# Patient Record
Sex: MALE | Race: BLACK OR AFRICAN AMERICAN | URBAN - METROPOLITAN AREA
[De-identification: names, ages, dates, MRNs, and addresses within clinical notes are randomized per-mention and may not be internally consistent; named-entity substitution may affect disease eponyms.]

---

## 2024-08-27 ENCOUNTER — APPOINTMENT (OUTPATIENT)
Dept: CT IMAGING | Age: 44
DRG: 510 | End: 2024-08-27
Payer: MEDICAID

## 2024-08-27 ENCOUNTER — HOSPITAL ENCOUNTER (INPATIENT)
Age: 44
LOS: 5 days | Discharge: HOME | DRG: 510 | End: 2024-09-01
Attending: EMERGENCY MEDICINE | Admitting: SURGERY
Payer: MEDICAID

## 2024-08-27 ENCOUNTER — APPOINTMENT (OUTPATIENT)
Dept: GENERAL RADIOLOGY | Age: 44
End: 2024-08-27
Payer: OTHER MISCELLANEOUS

## 2024-08-27 ENCOUNTER — APPOINTMENT (OUTPATIENT)
Dept: GENERAL RADIOLOGY | Age: 44
DRG: 510 | End: 2024-08-27
Payer: MEDICAID

## 2024-08-27 ENCOUNTER — HOSPITAL ENCOUNTER (EMERGENCY)
Age: 44
Discharge: ANOTHER HOSPITAL | End: 2024-08-27
Attending: EMERGENCY MEDICINE
Payer: OTHER MISCELLANEOUS

## 2024-08-27 ENCOUNTER — APPOINTMENT (OUTPATIENT)
Dept: CT IMAGING | Age: 44
End: 2024-08-27
Payer: OTHER MISCELLANEOUS

## 2024-08-27 ENCOUNTER — HOSPITAL ENCOUNTER (OUTPATIENT)
Dept: GENERAL RADIOLOGY | Age: 44
Discharge: HOME | End: 2024-08-29
Payer: OTHER MISCELLANEOUS

## 2024-08-27 VITALS
SYSTOLIC BLOOD PRESSURE: 108 MMHG | RESPIRATION RATE: 27 BRPM | HEART RATE: 97 BPM | DIASTOLIC BLOOD PRESSURE: 64 MMHG | OXYGEN SATURATION: 100 %

## 2024-08-27 DIAGNOSIS — S12.9XXA CLOSED FRACTURE OF MULTIPLE CERVICAL VERTEBRAE, INITIAL ENCOUNTER (HCC): ICD-10-CM
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
## 2024-08-27 DIAGNOSIS — S12.591A OTHER CLOSED NONDISPLACED FRACTURE OF SIXTH CERVICAL VERTEBRA, INITIAL ENCOUNTER (HCC): ICD-10-CM

## 2024-08-27 DIAGNOSIS — S52.332A CLOSED DISPLACED OBLIQUE FRACTURE OF SHAFT OF LEFT RADIUS, INITIAL ENCOUNTER: ICD-10-CM

## 2024-08-27 DIAGNOSIS — S22.43XA CLOSED FRACTURE OF MULTIPLE RIBS OF BOTH SIDES, INITIAL ENCOUNTER: ICD-10-CM

## 2024-08-27 DIAGNOSIS — T07.XXXA MULTIPLE INJURIES DUE TO TRAUMA: ICD-10-CM

## 2024-08-27 LAB
25(OH)D3 SERPL-MCNC: 10.6 NG/ML (ref 30–100)
ABO + RH BLD: NORMAL
ABO/RH: NORMAL
ALBUMIN SERPL-MCNC: 4.4 G/DL (ref 3.5–5.2)
ALBUMIN/GLOB SERPL: 1.6 {RATIO} (ref 1–2.5)
ALP SERPL-CCNC: 71 U/L (ref 40–129)
ALT SERPL-CCNC: 39 U/L (ref 10–50)
AMYLASE SERPL-CCNC: 142 U/L (ref 28–100)
ANION GAP SERPL CALCULATED.3IONS-SCNC: 12 MMOL/L (ref 9–16)
ANION GAP SERPL CALCULATED.3IONS-SCNC: 17 MMOL/L (ref 9–16)
ANTIBODY SCREEN: NEGATIVE
ARM BAND NUMBER: NORMAL
ARM BAND NUMBER: NORMAL
AST SERPL-CCNC: 44 U/L (ref 10–50)
BASOPHILS # BLD: 0 K/UL (ref 0–0.2)
BASOPHILS NFR BLD: 0 % (ref 0–2)
BILIRUB SERPL-MCNC: 0.8 MG/DL (ref 0–1.2)
BLOOD BANK DISPENSE STATUS: NORMAL
BLOOD BANK SAMPLE EXPIRATION: NORMAL
BLOOD BANK SAMPLE EXPIRATION: NORMAL
BLOOD BANK SPECIMEN: ABNORMAL
BLOOD GROUP ANTIBODIES SERPL: NEGATIVE
BODY TEMPERATURE: 37
BPU ID: NORMAL
BUN SERPL-MCNC: 11 MG/DL (ref 6–20)
BUN SERPL-MCNC: 13 MG/DL (ref 6–20)
BUN/CREAT SERPL: 8 (ref 9–20)
CALCIUM SERPL-MCNC: 9.3 MG/DL (ref 8.6–10.4)
CHLORIDE SERPL-SCNC: 101 MMOL/L (ref 98–107)
CHLORIDE SERPL-SCNC: 103 MMOL/L (ref 98–107)
CK SERPL-CCNC: 1203 U/L (ref 39–308)
CO2 SERPL-SCNC: 20 MMOL/L (ref 20–31)
CO2 SERPL-SCNC: 23 MMOL/L (ref 20–31)
COHGB MFR BLD: 4.2 % (ref 0–5)
COMPONENT: NORMAL
CREAT SERPL-MCNC: 1.2 MG/DL (ref 0.7–1.2)
CREAT SERPL-MCNC: 1.3 MG/DL (ref 0.7–1.2)
CROSSMATCH RESULT: NORMAL
EOSINOPHIL # BLD: 0 K/UL (ref 0–0.44)
EOSINOPHILS RELATIVE PERCENT: 0 % (ref 1–4)
ERYTHROCYTE [DISTWIDTH] IN BLOOD BY AUTOMATED COUNT: 12.4 % (ref 11.8–14.4)
ERYTHROCYTE [DISTWIDTH] IN BLOOD BY AUTOMATED COUNT: 12.7 % (ref 11.8–14.4)
ETHANOL PERCENT: <0.01 %
ETHANOL PERCENT: NORMAL %
ETHANOLAMINE SERPL-MCNC: <10 MG/DL (ref 0–0.08)
ETHANOLAMINE SERPL-MCNC: <10 MG/DL (ref 0–0.08)
FIBRINOGEN, FUNCTIONAL TEG: 25.7 MM (ref 15–32)
FIO2 ON VENT: ABNORMAL %
GFR, ESTIMATED: 43 ML/MIN/1.73M2
GFR, ESTIMATED: 67 ML/MIN/1.73M2
GLUCOSE SERPL-MCNC: 160 MG/DL (ref 74–99)
GLUCOSE SERPL-MCNC: 207 MG/DL (ref 74–99)
HCO3 VENOUS: 23.9 MMOL/L (ref 24–30)
HCT VFR BLD AUTO: 43.7 % (ref 40.7–50.3)
HCT VFR BLD AUTO: 44.5 % (ref 40.7–50.3)
HGB BLD-MCNC: 15.4 G/DL (ref 13–17)
HGB BLD-MCNC: 16 G/DL (ref 13–17)
IMM GRANULOCYTES # BLD AUTO: 0 K/UL (ref 0–0.3)
IMM GRANULOCYTES NFR BLD: 0 %
INR PPP: 1
INR PPP: 1.1
LIPASE SERPL-CCNC: 32 U/L (ref 13–60)
LY30 (LYSIS) TEG: 0.4 % (ref 0–2.6)
LYMPHOCYTES NFR BLD: 7.26 K/UL (ref 1.1–3.7)
LYMPHOCYTES RELATIVE PERCENT: 30 % (ref 24–43)
MA(MAX CLOT) RAPID TEG: 67.2 MM (ref 52–70)
MCH RBC QN AUTO: 32.1 PG (ref 25.2–33.5)
MCH RBC QN AUTO: 32.2 PG (ref 25.2–33.5)
MCHC RBC AUTO-ENTMCNC: 35.2 G/DL (ref 28.4–34.8)
MCHC RBC AUTO-ENTMCNC: 36 G/DL (ref 28.4–34.8)
MCV RBC AUTO: 89.4 FL (ref 82.6–102.9)
MCV RBC AUTO: 91.2 FL (ref 82.6–102.9)
MONOCYTES NFR BLD: 1.94 K/UL (ref 0.1–1.2)
MONOCYTES NFR BLD: 8 % (ref 3–12)
MORPHOLOGY: ABNORMAL
MORPHOLOGY: ABNORMAL
MYOGLOBIN SERPL-MCNC: 1302 NG/ML (ref 28–72)
NEGATIVE BASE EXCESS, VEN: 0.5 MMOL/L (ref 0–2)
NEUTROPHILS NFR BLD: 62 % (ref 36–65)
NEUTS SEG NFR BLD: 15 K/UL (ref 1.5–8.1)
NRBC BLD-RTO: 0 PER 100 WBC
NRBC BLD-RTO: 0 PER 100 WBC
O2 SAT, VEN: 95.8 % (ref 60–85)
PARTIAL THROMBOPLASTIN TIME: 24.9 SEC (ref 23–36.5)
PARTIAL THROMBOPLASTIN TIME: 29.6 SEC (ref 26.8–34.8)
PCO2 VENOUS: 40.5 MM HG (ref 39–55)
PH VENOUS: 7.39 (ref 7.32–7.42)
PLATELET # BLD AUTO: 456 K/UL (ref 138–453)
PLATELET # BLD AUTO: 506 K/UL (ref 138–453)
PMV BLD AUTO: 9.6 FL (ref 8.1–13.5)
PMV BLD AUTO: 9.8 FL (ref 8.1–13.5)
PO2 VENOUS: 80.9 MM HG (ref 30–50)
POTASSIUM SERPL-SCNC: 3.8 MMOL/L (ref 3.7–5.3)
POTASSIUM SERPL-SCNC: 3.8 MMOL/L (ref 3.7–5.3)
PROT SERPL-MCNC: 7.1 G/DL (ref 6.6–8.7)
PROTHROMBIN TIME: 13.3 SEC (ref 11.7–14.9)
PROTHROMBIN TIME: 13.5 SEC (ref 11.7–14.1)
RBC # BLD AUTO: 4.79 M/UL (ref 4.21–5.77)
RBC # BLD AUTO: 4.98 M/UL (ref 4.21–5.77)
REACTION TIME TEG: 4.7 MIN (ref 4.6–9.1)
SODIUM SERPL-SCNC: 138 MMOL/L (ref 136–145)
SODIUM SERPL-SCNC: 138 MMOL/L (ref 136–145)
TRANSFUSION STATUS: NORMAL
TROPONIN I SERPL HS-MCNC: 14 NG/L (ref 0–22)
TROPONIN I SERPL HS-MCNC: 16 NG/L (ref 0–22)
UNIT DIVISION: 0
WBC OTHER # BLD: 24.2 K/UL (ref 3.5–11.3)
WBC OTHER # BLD: 36.9 K/UL (ref 3.5–11.3)

## 2024-08-27 PROCEDURE — 72125 CT NECK SPINE W/O DYE: CPT

## 2024-08-27 PROCEDURE — 96375 TX/PRO/DX INJ NEW DRUG ADDON: CPT

## 2024-08-27 PROCEDURE — 76376 3D RENDER W/INTRP POSTPROCES: CPT

## 2024-08-27 PROCEDURE — 80307 DRUG TEST PRSMV CHEM ANLYZR: CPT

## 2024-08-27 PROCEDURE — 71260 CT THORAX DX C+: CPT

## 2024-08-27 PROCEDURE — 82565 ASSAY OF CREATININE: CPT

## 2024-08-27 PROCEDURE — 2060000000 HC ICU INTERMEDIATE R&B

## 2024-08-27 PROCEDURE — 2580000003 HC RX 258: Performed by: EMERGENCY MEDICINE

## 2024-08-27 PROCEDURE — 85730 THROMBOPLASTIN TIME PARTIAL: CPT

## 2024-08-27 PROCEDURE — 73562 X-RAY EXAM OF KNEE 3: CPT

## 2024-08-27 PROCEDURE — 82150 ASSAY OF AMYLASE: CPT

## 2024-08-27 PROCEDURE — 6810039001 HC L1 TRAUMA PRIORITY

## 2024-08-27 PROCEDURE — 96365 THER/PROPH/DIAG IV INF INIT: CPT

## 2024-08-27 PROCEDURE — 6370000000 HC RX 637 (ALT 250 FOR IP): Performed by: NURSE PRACTITIONER

## 2024-08-27 PROCEDURE — 73090 X-RAY EXAM OF FOREARM: CPT

## 2024-08-27 PROCEDURE — 84484 ASSAY OF TROPONIN QUANT: CPT

## 2024-08-27 PROCEDURE — 73110 X-RAY EXAM OF WRIST: CPT

## 2024-08-27 PROCEDURE — 86900 BLOOD TYPING SEROLOGIC ABO: CPT

## 2024-08-27 PROCEDURE — 82550 ASSAY OF CK (CPK): CPT

## 2024-08-27 PROCEDURE — 72040 X-RAY EXAM NECK SPINE 2-3 VW: CPT

## 2024-08-27 PROCEDURE — 6360000002 HC RX W HCPCS: Performed by: EMERGENCY MEDICINE

## 2024-08-27 PROCEDURE — 85576 BLOOD PLATELET AGGREGATION: CPT

## 2024-08-27 PROCEDURE — 73590 X-RAY EXAM OF LOWER LEG: CPT

## 2024-08-27 PROCEDURE — 85390 FIBRINOLYSINS SCREEN I&R: CPT

## 2024-08-27 PROCEDURE — 99222 1ST HOSP IP/OBS MODERATE 55: CPT | Performed by: NURSE PRACTITIONER

## 2024-08-27 PROCEDURE — 6360000004 HC RX CONTRAST MEDICATION: Performed by: EMERGENCY MEDICINE

## 2024-08-27 PROCEDURE — 86850 RBC ANTIBODY SCREEN: CPT

## 2024-08-27 PROCEDURE — 83874 ASSAY OF MYOGLOBIN: CPT

## 2024-08-27 PROCEDURE — 73080 X-RAY EXAM OF ELBOW: CPT

## 2024-08-27 PROCEDURE — 85027 COMPLETE CBC AUTOMATED: CPT

## 2024-08-27 PROCEDURE — 86901 BLOOD TYPING SEROLOGIC RH(D): CPT

## 2024-08-27 PROCEDURE — G0480 DRUG TEST DEF 1-7 CLASSES: HCPCS

## 2024-08-27 PROCEDURE — 84703 CHORIONIC GONADOTROPIN ASSAY: CPT

## 2024-08-27 PROCEDURE — 71045 X-RAY EXAM CHEST 1 VIEW: CPT

## 2024-08-27 PROCEDURE — 70450 CT HEAD/BRAIN W/O DYE: CPT

## 2024-08-27 PROCEDURE — 6360000002 HC RX W HCPCS: Performed by: PHYSICIAN ASSISTANT

## 2024-08-27 PROCEDURE — 84520 ASSAY OF UREA NITROGEN: CPT

## 2024-08-27 PROCEDURE — 82947 ASSAY GLUCOSE BLOOD QUANT: CPT

## 2024-08-27 PROCEDURE — 82805 BLOOD GASES W/O2 SATURATION: CPT

## 2024-08-27 PROCEDURE — 99285 EMERGENCY DEPT VISIT HI MDM: CPT

## 2024-08-27 PROCEDURE — 90715 TDAP VACCINE 7 YRS/> IM: CPT | Performed by: PHYSICIAN ASSISTANT

## 2024-08-27 PROCEDURE — 94761 N-INVAS EAR/PLS OXIMETRY MLT: CPT

## 2024-08-27 PROCEDURE — 80051 ELECTROLYTE PANEL: CPT

## 2024-08-27 PROCEDURE — 6360000002 HC RX W HCPCS

## 2024-08-27 PROCEDURE — 6360000004 HC RX CONTRAST MEDICATION: Performed by: NURSE PRACTITIONER

## 2024-08-27 PROCEDURE — 85384 FIBRINOGEN ACTIVITY: CPT

## 2024-08-27 PROCEDURE — 85347 COAGULATION TIME ACTIVATED: CPT

## 2024-08-27 PROCEDURE — 73130 X-RAY EXAM OF HAND: CPT

## 2024-08-27 PROCEDURE — 2580000003 HC RX 258: Performed by: NURSE PRACTITIONER

## 2024-08-27 PROCEDURE — 80053 COMPREHEN METABOLIC PANEL: CPT

## 2024-08-27 PROCEDURE — 85610 PROTHROMBIN TIME: CPT

## 2024-08-27 PROCEDURE — 83690 ASSAY OF LIPASE: CPT

## 2024-08-27 PROCEDURE — 71275 CT ANGIOGRAPHY CHEST: CPT

## 2024-08-27 PROCEDURE — 70498 CT ANGIOGRAPHY NECK: CPT

## 2024-08-27 PROCEDURE — 85025 COMPLETE CBC W/AUTO DIFF WBC: CPT

## 2024-08-27 PROCEDURE — 70486 CT MAXILLOFACIAL W/O DYE: CPT

## 2024-08-27 PROCEDURE — 82306 VITAMIN D 25 HYDROXY: CPT

## 2024-08-27 PROCEDURE — 90471 IMMUNIZATION ADMIN: CPT | Performed by: PHYSICIAN ASSISTANT

## 2024-08-27 RX ORDER — CEFAZOLIN SODIUM IN 0.9 % NACL 2 G/100 ML
2000 PLASTIC BAG, INJECTION (ML) INTRAVENOUS ONCE
Status: COMPLETED | OUTPATIENT
Start: 2024-08-27 | End: 2024-08-27

## 2024-08-27 RX ORDER — METHOCARBAMOL 750 MG/1
750 TABLET, FILM COATED ORAL EVERY 6 HOURS
Status: DISCONTINUED | OUTPATIENT
Start: 2024-08-27 | End: 2024-09-01 | Stop reason: HOSPADM

## 2024-08-27 RX ORDER — SODIUM CHLORIDE 9 MG/ML
INJECTION, SOLUTION INTRAVENOUS PRN
Status: DISCONTINUED | OUTPATIENT
Start: 2024-08-27 | End: 2024-09-01 | Stop reason: HOSPADM

## 2024-08-27 RX ORDER — IOPAMIDOL 755 MG/ML
75 INJECTION, SOLUTION INTRAVASCULAR
Status: COMPLETED | OUTPATIENT
Start: 2024-08-27 | End: 2024-08-27

## 2024-08-27 RX ORDER — ONDANSETRON 2 MG/ML
4 INJECTION INTRAMUSCULAR; INTRAVENOUS EVERY 6 HOURS PRN
Status: DISCONTINUED | OUTPATIENT
Start: 2024-08-27 | End: 2024-09-01 | Stop reason: HOSPADM

## 2024-08-27 RX ORDER — POLYETHYLENE GLYCOL 3350 17 G/17G
17 POWDER, FOR SOLUTION ORAL DAILY
Status: DISCONTINUED | OUTPATIENT
Start: 2024-08-27 | End: 2024-09-01 | Stop reason: HOSPADM

## 2024-08-27 RX ORDER — IOPAMIDOL 755 MG/ML
150 INJECTION, SOLUTION INTRAVASCULAR
Status: COMPLETED | OUTPATIENT
Start: 2024-08-27 | End: 2024-08-27

## 2024-08-27 RX ORDER — SODIUM CHLORIDE, SODIUM LACTATE, POTASSIUM CHLORIDE, AND CALCIUM CHLORIDE .6; .31; .03; .02 G/100ML; G/100ML; G/100ML; G/100ML
1000 INJECTION, SOLUTION INTRAVENOUS ONCE
Status: COMPLETED | OUTPATIENT
Start: 2024-08-27 | End: 2024-08-27

## 2024-08-27 RX ORDER — FENTANYL CITRATE 50 UG/ML
50 INJECTION, SOLUTION INTRAMUSCULAR; INTRAVENOUS ONCE
Status: COMPLETED | OUTPATIENT
Start: 2024-08-27 | End: 2024-08-27

## 2024-08-27 RX ORDER — 0.9 % SODIUM CHLORIDE 0.9 %
1000 INTRAVENOUS SOLUTION INTRAVENOUS ONCE
Status: DISCONTINUED | OUTPATIENT
Start: 2024-08-27 | End: 2024-08-27 | Stop reason: HOSPADM

## 2024-08-27 RX ORDER — ONDANSETRON 4 MG/1
4 TABLET, ORALLY DISINTEGRATING ORAL EVERY 8 HOURS PRN
Status: DISCONTINUED | OUTPATIENT
Start: 2024-08-27 | End: 2024-09-01 | Stop reason: HOSPADM

## 2024-08-27 RX ORDER — FENTANYL CITRATE 50 UG/ML
25 INJECTION, SOLUTION INTRAMUSCULAR; INTRAVENOUS ONCE
Status: DISCONTINUED | OUTPATIENT
Start: 2024-08-27 | End: 2024-08-27

## 2024-08-27 RX ORDER — OXYCODONE HYDROCHLORIDE 5 MG/1
5 TABLET ORAL EVERY 6 HOURS PRN
Status: DISCONTINUED | OUTPATIENT
Start: 2024-08-27 | End: 2024-08-29

## 2024-08-27 RX ORDER — SODIUM CHLORIDE 0.9 % (FLUSH) 0.9 %
10 SYRINGE (ML) INJECTION PRN
Status: DISCONTINUED | OUTPATIENT
Start: 2024-08-27 | End: 2024-09-01 | Stop reason: HOSPADM

## 2024-08-27 RX ORDER — PANTOPRAZOLE SODIUM 40 MG/1
40 TABLET, DELAYED RELEASE ORAL DAILY
COMMUNITY

## 2024-08-27 RX ORDER — SODIUM CHLORIDE, SODIUM LACTATE, POTASSIUM CHLORIDE, CALCIUM CHLORIDE 600; 310; 30; 20 MG/100ML; MG/100ML; MG/100ML; MG/100ML
INJECTION, SOLUTION INTRAVENOUS CONTINUOUS
Status: DISCONTINUED | OUTPATIENT
Start: 2024-08-27 | End: 2024-08-29

## 2024-08-27 RX ORDER — ACETAMINOPHEN 500 MG
1000 TABLET ORAL EVERY 8 HOURS SCHEDULED
Status: DISCONTINUED | OUTPATIENT
Start: 2024-08-27 | End: 2024-09-01 | Stop reason: HOSPADM

## 2024-08-27 RX ORDER — ERGOCALCIFEROL 1.25 MG/1
50000 CAPSULE, LIQUID FILLED ORAL WEEKLY
Qty: 12 CAPSULE | Refills: 1 | Status: SHIPPED | OUTPATIENT
Start: 2024-08-27

## 2024-08-27 RX ORDER — SODIUM CHLORIDE 0.9 % (FLUSH) 0.9 %
5-40 SYRINGE (ML) INJECTION EVERY 12 HOURS SCHEDULED
Status: DISCONTINUED | OUTPATIENT
Start: 2024-08-27 | End: 2024-09-01 | Stop reason: HOSPADM

## 2024-08-27 RX ORDER — ONDANSETRON 2 MG/ML
4 INJECTION INTRAMUSCULAR; INTRAVENOUS ONCE
Status: COMPLETED | OUTPATIENT
Start: 2024-08-27 | End: 2024-08-27

## 2024-08-27 RX ORDER — SODIUM CHLORIDE 9 MG/ML
INJECTION, SOLUTION INTRAVENOUS CONTINUOUS
Status: DISCONTINUED | OUTPATIENT
Start: 2024-08-27 | End: 2024-08-27

## 2024-08-27 RX ORDER — GABAPENTIN 300 MG/1
300 CAPSULE ORAL 3 TIMES DAILY
Status: DISCONTINUED | OUTPATIENT
Start: 2024-08-27 | End: 2024-08-28

## 2024-08-27 RX ADMIN — OXYCODONE HYDROCHLORIDE 5 MG: 5 TABLET ORAL at 23:56

## 2024-08-27 RX ADMIN — SODIUM CHLORIDE, POTASSIUM CHLORIDE, SODIUM LACTATE AND CALCIUM CHLORIDE: 600; 310; 30; 20 INJECTION, SOLUTION INTRAVENOUS at 22:32

## 2024-08-27 RX ADMIN — ACETAMINOPHEN 1000 MG: 500 TABLET ORAL at 16:14

## 2024-08-27 RX ADMIN — ACETAMINOPHEN 1000 MG: 500 TABLET ORAL at 20:54

## 2024-08-27 RX ADMIN — SODIUM CHLORIDE: 9 INJECTION, SOLUTION INTRAVENOUS at 15:32

## 2024-08-27 RX ADMIN — METHOCARBAMOL 750 MG: 750 TABLET ORAL at 20:54

## 2024-08-27 RX ADMIN — FENTANYL CITRATE 50 MCG: 50 INJECTION INTRAMUSCULAR; INTRAVENOUS at 13:52

## 2024-08-27 RX ADMIN — METHOCARBAMOL 750 MG: 750 TABLET ORAL at 16:14

## 2024-08-27 RX ADMIN — SODIUM CHLORIDE, POTASSIUM CHLORIDE, SODIUM LACTATE AND CALCIUM CHLORIDE: 600; 310; 30; 20 INJECTION, SOLUTION INTRAVENOUS at 15:25

## 2024-08-27 RX ADMIN — Medication 2000 MG: at 13:36

## 2024-08-27 RX ADMIN — ONDANSETRON 4 MG: 2 INJECTION INTRAMUSCULAR; INTRAVENOUS at 13:53

## 2024-08-27 RX ADMIN — IOPAMIDOL 150 ML: 755 INJECTION, SOLUTION INTRAVENOUS at 16:05

## 2024-08-27 RX ADMIN — IOPAMIDOL 75 ML: 755 INJECTION, SOLUTION INTRAVENOUS at 13:30

## 2024-08-27 RX ADMIN — OXYCODONE HYDROCHLORIDE 5 MG: 5 TABLET ORAL at 18:10

## 2024-08-27 RX ADMIN — TETANUS TOXOID, REDUCED DIPHTHERIA TOXOID AND ACELLULAR PERTUSSIS VACCINE, ADSORBED 0.5 ML: 5; 2.5; 8; 8; 2.5 SUSPENSION INTRAMUSCULAR at 13:35

## 2024-08-27 RX ADMIN — HYDROMORPHONE HYDROCHLORIDE 0.5 MG: 1 INJECTION, SOLUTION INTRAMUSCULAR; INTRAVENOUS; SUBCUTANEOUS at 15:20

## 2024-08-27 RX ADMIN — GABAPENTIN 300 MG: 300 CAPSULE ORAL at 16:14

## 2024-08-27 RX ADMIN — SODIUM CHLORIDE, SODIUM LACTATE, POTASSIUM CHLORIDE, AND CALCIUM CHLORIDE 1000 ML: .6; .31; .03; .02 INJECTION, SOLUTION INTRAVENOUS at 13:52

## 2024-08-27 RX ADMIN — SODIUM CHLORIDE, PRESERVATIVE FREE 10 ML: 5 INJECTION INTRAVENOUS at 22:35

## 2024-08-27 RX ADMIN — GABAPENTIN 300 MG: 300 CAPSULE ORAL at 20:54

## 2024-08-27 ASSESSMENT — PAIN DESCRIPTION - ORIENTATION
ORIENTATION: MID
ORIENTATION: LEFT

## 2024-08-27 ASSESSMENT — PAIN SCALES - GENERAL
PAINLEVEL_OUTOF10: 10
PAINLEVEL_OUTOF10: 8
PAINLEVEL_OUTOF10: 10
PAINLEVEL_OUTOF10: 3
PAINLEVEL_OUTOF10: 10

## 2024-08-27 ASSESSMENT — PAIN DESCRIPTION - LOCATION
LOCATION: ARM
LOCATION: CHEST;ABDOMEN
LOCATION: ARM
LOCATION: BACK;CHEST
LOCATION: CHEST
LOCATION: BACK

## 2024-08-27 ASSESSMENT — LIFESTYLE VARIABLES
HOW OFTEN DO YOU HAVE A DRINK CONTAINING ALCOHOL: NEVER
HOW MANY STANDARD DRINKS CONTAINING ALCOHOL DO YOU HAVE ON A TYPICAL DAY: PATIENT DOES NOT DRINK
HOW OFTEN DO YOU HAVE A DRINK CONTAINING ALCOHOL: NEVER
HOW MANY STANDARD DRINKS CONTAINING ALCOHOL DO YOU HAVE ON A TYPICAL DAY: PATIENT DOES NOT DRINK

## 2024-08-27 ASSESSMENT — PAIN - FUNCTIONAL ASSESSMENT: PAIN_FUNCTIONAL_ASSESSMENT: PREVENTS OR INTERFERES WITH MANY ACTIVE NOT PASSIVE ACTIVITIES

## 2024-08-27 ASSESSMENT — PAIN DESCRIPTION - DESCRIPTORS
DESCRIPTORS: SHARP
DESCRIPTORS: SHARP
DESCRIPTORS: ACHING;DULL

## 2024-08-27 ASSESSMENT — ENCOUNTER SYMPTOMS
BACK PAIN: 1
FACIAL SWELLING: 1
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
## 2024-08-27 NOTE — ED NOTES
Labeled blood specimens sent to lab via tube system.    [] Lavender   [] on ice   [x] Blue   [] Green/yellow  [] Green/black [] on ice  [] Pink  [] Red  [] Yellow  [] on ice  [] Blood Cultures  [] x1 [] x2
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
## 2024-08-27 NOTE — ED NOTES
Report taken from previous shift RN. Pt introduced to writer. Pt resting in bed with call light in reach, respirations even and unlabored, NAD. Pt has no verbalized needs at this time. Whiteboards updated.
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
## 2024-08-28 ENCOUNTER — ANESTHESIA (OUTPATIENT)
Dept: OPERATING ROOM | Age: 44
End: 2024-08-28
Payer: MEDICAID

## 2024-08-28 ENCOUNTER — APPOINTMENT (OUTPATIENT)
Dept: GENERAL RADIOLOGY | Age: 44
DRG: 510 | End: 2024-08-28
Payer: MEDICAID

## 2024-08-28 ENCOUNTER — ANESTHESIA (OUTPATIENT)
Dept: ONCOLOGY | Age: 44
End: 2024-08-28
Payer: MEDICAID

## 2024-08-28 ENCOUNTER — ANESTHESIA EVENT (OUTPATIENT)
Dept: ONCOLOGY | Age: 44
End: 2024-08-28
Payer: MEDICAID

## 2024-08-28 ENCOUNTER — ANESTHESIA EVENT (OUTPATIENT)
Dept: OPERATING ROOM | Age: 44
End: 2024-08-28
Payer: MEDICAID

## 2024-08-28 PROBLEM — S52.302A CLOSED FRACTURE OF SHAFT OF LEFT RADIUS: Status: ACTIVE | Noted: 2024-08-28

## 2024-08-28 PROBLEM — S22.43XA MULTIPLE CLOSED FRACTURES OF RIBS OF BOTH SIDES: Status: ACTIVE | Noted: 2024-08-28

## 2024-08-28 PROBLEM — S12.9XXA CLOSED FRACTURE OF MULTIPLE CERVICAL VERTEBRAE (HCC): Status: ACTIVE | Noted: 2024-08-28

## 2024-08-28 LAB
AMPHET UR QL SCN: NEGATIVE
ANION GAP SERPL CALCULATED.3IONS-SCNC: 12 MMOL/L (ref 9–16)
BARBITURATES UR QL SCN: NEGATIVE
BASOPHILS # BLD: 0 K/UL (ref 0–0.2)
BASOPHILS NFR BLD: 0 % (ref 0–2)
BENZODIAZ UR QL: NEGATIVE
BUN SERPL-MCNC: 12 MG/DL (ref 6–20)
CALCIUM SERPL-MCNC: 8.8 MG/DL (ref 8.6–10.4)
CANNABINOIDS UR QL SCN: POSITIVE
CHLORIDE SERPL-SCNC: 103 MMOL/L (ref 98–107)
CO2 SERPL-SCNC: 21 MMOL/L (ref 20–31)
COCAINE UR QL SCN: POSITIVE
CREAT SERPL-MCNC: 0.9 MG/DL (ref 0.7–1.2)
EOSINOPHIL # BLD: 0 K/UL (ref 0–0.44)
EOSINOPHILS RELATIVE PERCENT: 0 % (ref 1–4)
ERYTHROCYTE [DISTWIDTH] IN BLOOD BY AUTOMATED COUNT: 13.1 % (ref 11.8–14.4)
FENTANYL UR QL: POSITIVE
GFR, ESTIMATED: >90 ML/MIN/1.73M2
GLUCOSE BLD-MCNC: 143 MG/DL (ref 75–110)
GLUCOSE SERPL-MCNC: 126 MG/DL (ref 74–99)
HCT VFR BLD AUTO: 38.9 % (ref 40.7–50.3)
HCT VFR BLD AUTO: 41.9 % (ref 40.7–50.3)
HGB BLD-MCNC: 13.2 G/DL (ref 13–17)
HGB BLD-MCNC: 14.4 G/DL (ref 13–17)
IMM GRANULOCYTES # BLD AUTO: 0 K/UL (ref 0–0.3)
IMM GRANULOCYTES NFR BLD: 0 %
INR PPP: 1.1
LYMPHOCYTES NFR BLD: 2.34 K/UL (ref 1.1–3.7)
LYMPHOCYTES RELATIVE PERCENT: 15 % (ref 24–43)
MCH RBC QN AUTO: 32.1 PG (ref 25.2–33.5)
MCHC RBC AUTO-ENTMCNC: 34.4 G/DL (ref 28.4–34.8)
MCV RBC AUTO: 93.5 FL (ref 82.6–102.9)
METHADONE UR QL: NEGATIVE
MONOCYTES NFR BLD: 1.87 K/UL (ref 0.1–1.2)
MONOCYTES NFR BLD: 12 % (ref 3–12)
MORPHOLOGY: NORMAL
NEUTROPHILS NFR BLD: 73 % (ref 36–65)
NEUTS SEG NFR BLD: 11.39 K/UL (ref 1.5–8.1)
NRBC BLD-RTO: 0 PER 100 WBC
OPIATES UR QL SCN: NEGATIVE
OXYCODONE UR QL SCN: POSITIVE
PCP UR QL SCN: NEGATIVE
PLATELET # BLD AUTO: 381 K/UL (ref 138–453)
PMV BLD AUTO: 10.3 FL (ref 8.1–13.5)
POTASSIUM SERPL-SCNC: 4.4 MMOL/L (ref 3.7–5.3)
PROTHROMBIN TIME: 13.6 SEC (ref 11.7–14.9)
RBC # BLD AUTO: 4.48 M/UL (ref 4.21–5.77)
SODIUM SERPL-SCNC: 136 MMOL/L (ref 136–145)
TEST INFORMATION: ABNORMAL
WBC OTHER # BLD: 15.6 K/UL (ref 3.5–11.3)

## 2024-08-28 PROCEDURE — 6360000002 HC RX W HCPCS: Performed by: CHIROPRACTOR

## 2024-08-28 PROCEDURE — 0PSJ04Z REPOSITION LEFT RADIUS WITH INTERNAL FIXATION DEVICE, OPEN APPROACH: ICD-10-PCS | Performed by: ORTHOPAEDIC SURGERY

## 2024-08-28 PROCEDURE — 6360000002 HC RX W HCPCS: Performed by: NURSE ANESTHETIST, CERTIFIED REGISTERED

## 2024-08-28 PROCEDURE — 2700000000 HC OXYGEN THERAPY PER DAY

## 2024-08-28 PROCEDURE — C9290 INJ, BUPIVACAINE LIPOSOME: HCPCS | Performed by: ANESTHESIOLOGY

## 2024-08-28 PROCEDURE — 85025 COMPLETE CBC W/AUTO DIFF WBC: CPT

## 2024-08-28 PROCEDURE — 6360000002 HC RX W HCPCS: Performed by: ANESTHESIOLOGY

## 2024-08-28 PROCEDURE — 94761 N-INVAS EAR/PLS OXIMETRY MLT: CPT

## 2024-08-28 PROCEDURE — 85014 HEMATOCRIT: CPT

## 2024-08-28 PROCEDURE — 6370000000 HC RX 637 (ALT 250 FOR IP)

## 2024-08-28 PROCEDURE — 6360000002 HC RX W HCPCS

## 2024-08-28 PROCEDURE — 3600000015 HC SURGERY LEVEL 5 ADDTL 15MIN: Performed by: ORTHOPAEDIC SURGERY

## 2024-08-28 PROCEDURE — 85018 HEMOGLOBIN: CPT

## 2024-08-28 PROCEDURE — 7100000001 HC PACU RECOVERY - ADDTL 15 MIN: Performed by: ORTHOPAEDIC SURGERY

## 2024-08-28 PROCEDURE — 6370000000 HC RX 637 (ALT 250 FOR IP): Performed by: NURSE PRACTITIONER

## 2024-08-28 PROCEDURE — 25515 OPTX RADIAL SHAFT FRACTURE: CPT | Performed by: ORTHOPAEDIC SURGERY

## 2024-08-28 PROCEDURE — APPSS15 APP SPLIT SHARED TIME 0-15 MINUTES: Performed by: NURSE PRACTITIONER

## 2024-08-28 PROCEDURE — 3700000001 HC ADD 15 MINUTES (ANESTHESIA): Performed by: ORTHOPAEDIC SURGERY

## 2024-08-28 PROCEDURE — 7100000000 HC PACU RECOVERY - FIRST 15 MIN: Performed by: ORTHOPAEDIC SURGERY

## 2024-08-28 PROCEDURE — 73090 X-RAY EXAM OF FOREARM: CPT

## 2024-08-28 PROCEDURE — 85610 PROTHROMBIN TIME: CPT

## 2024-08-28 PROCEDURE — 3700000000 HC ANESTHESIA ATTENDED CARE: Performed by: ORTHOPAEDIC SURGERY

## 2024-08-28 PROCEDURE — 76942 ECHO GUIDE FOR BIOPSY: CPT | Performed by: ANESTHESIOLOGY

## 2024-08-28 PROCEDURE — 99254 IP/OBS CNSLTJ NEW/EST MOD 60: CPT | Performed by: ORTHOPAEDIC SURGERY

## 2024-08-28 PROCEDURE — 71045 X-RAY EXAM CHEST 1 VIEW: CPT

## 2024-08-28 PROCEDURE — 1200000000 HC SEMI PRIVATE

## 2024-08-28 PROCEDURE — 2709999900 HC NON-CHARGEABLE SUPPLY: Performed by: ORTHOPAEDIC SURGERY

## 2024-08-28 PROCEDURE — 36415 COLL VENOUS BLD VENIPUNCTURE: CPT

## 2024-08-28 PROCEDURE — 2580000003 HC RX 258: Performed by: ORTHOPAEDIC SURGERY

## 2024-08-28 PROCEDURE — 2580000003 HC RX 258

## 2024-08-28 PROCEDURE — 2500000003 HC RX 250 WO HCPCS

## 2024-08-28 PROCEDURE — 3600000005 HC SURGERY LEVEL 5 BASE: Performed by: ORTHOPAEDIC SURGERY

## 2024-08-28 PROCEDURE — 2580000003 HC RX 258: Performed by: NURSE PRACTITIONER

## 2024-08-28 PROCEDURE — 99232 SBSQ HOSP IP/OBS MODERATE 35: CPT | Performed by: SURGERY

## 2024-08-28 PROCEDURE — C1713 ANCHOR/SCREW BN/BN,TIS/BN: HCPCS | Performed by: ORTHOPAEDIC SURGERY

## 2024-08-28 PROCEDURE — 6370000000 HC RX 637 (ALT 250 FOR IP): Performed by: STUDENT IN AN ORGANIZED HEALTH CARE EDUCATION/TRAINING PROGRAM

## 2024-08-28 PROCEDURE — 82947 ASSAY GLUCOSE BLOOD QUANT: CPT

## 2024-08-28 PROCEDURE — 80048 BASIC METABOLIC PNL TOTAL CA: CPT

## 2024-08-28 DEVICE — PLATE BNE L90MM THK3.3MM 7 H BILAT S STL STR LIMIT CNTCT: Type: IMPLANTABLE DEVICE | Site: RADIUS | Status: FUNCTIONAL

## 2024-08-28 DEVICE — SCREW BNE L14MM DIA3.5MM CORT S STL ST NONCANNULATED LOK: Type: IMPLANTABLE DEVICE | Site: RADIUS | Status: FUNCTIONAL

## 2024-08-28 DEVICE — SCREW BNE L16MM DIA3.5MM CORT S STL ST NONCANNULATED LOK: Type: IMPLANTABLE DEVICE | Site: RADIUS | Status: FUNCTIONAL

## 2024-08-28 RX ORDER — BUPIVACAINE HYDROCHLORIDE 5 MG/ML
INJECTION, SOLUTION EPIDURAL; INTRACAUDAL
Status: COMPLETED | OUTPATIENT
Start: 2024-08-28 | End: 2024-08-28

## 2024-08-28 RX ORDER — FENTANYL CITRATE 50 UG/ML
100 INJECTION, SOLUTION INTRAMUSCULAR; INTRAVENOUS ONCE
Status: COMPLETED | OUTPATIENT
Start: 2024-08-28 | End: 2024-08-28

## 2024-08-28 RX ORDER — FENTANYL CITRATE 50 UG/ML
50 INJECTION, SOLUTION INTRAMUSCULAR; INTRAVENOUS
Status: COMPLETED | OUTPATIENT
Start: 2024-08-28 | End: 2024-08-28

## 2024-08-28 RX ORDER — MIDAZOLAM HYDROCHLORIDE 1 MG/ML
INJECTION INTRAMUSCULAR; INTRAVENOUS PRN
Status: DISCONTINUED | OUTPATIENT
Start: 2024-08-28 | End: 2024-08-28 | Stop reason: SDUPTHER

## 2024-08-28 RX ORDER — NALOXONE HYDROCHLORIDE 0.4 MG/ML
INJECTION, SOLUTION INTRAMUSCULAR; INTRAVENOUS; SUBCUTANEOUS PRN
Status: DISCONTINUED | OUTPATIENT
Start: 2024-08-28 | End: 2024-08-28 | Stop reason: HOSPADM

## 2024-08-28 RX ORDER — SODIUM CHLORIDE, SODIUM LACTATE, POTASSIUM CHLORIDE, CALCIUM CHLORIDE 600; 310; 30; 20 MG/100ML; MG/100ML; MG/100ML; MG/100ML
INJECTION, SOLUTION INTRAVENOUS CONTINUOUS PRN
Status: DISCONTINUED | OUTPATIENT
Start: 2024-08-28 | End: 2024-08-28 | Stop reason: SDUPTHER

## 2024-08-28 RX ORDER — HYDRALAZINE HYDROCHLORIDE 20 MG/ML
10 INJECTION INTRAMUSCULAR; INTRAVENOUS ONCE
Status: COMPLETED | OUTPATIENT
Start: 2024-08-28 | End: 2024-08-28

## 2024-08-28 RX ORDER — LIDOCAINE HYDROCHLORIDE 10 MG/ML
INJECTION, SOLUTION EPIDURAL; INFILTRATION; INTRACAUDAL; PERINEURAL PRN
Status: DISCONTINUED | OUTPATIENT
Start: 2024-08-28 | End: 2024-08-28 | Stop reason: SDUPTHER

## 2024-08-28 RX ORDER — PROPOFOL 10 MG/ML
INJECTION, EMULSION INTRAVENOUS PRN
Status: DISCONTINUED | OUTPATIENT
Start: 2024-08-28 | End: 2024-08-28 | Stop reason: SDUPTHER

## 2024-08-28 RX ORDER — ENOXAPARIN SODIUM 100 MG/ML
30 INJECTION SUBCUTANEOUS 2 TIMES DAILY
Status: DISCONTINUED | OUTPATIENT
Start: 2024-08-29 | End: 2024-08-29

## 2024-08-28 RX ORDER — HYDRALAZINE HYDROCHLORIDE 20 MG/ML
10 INJECTION INTRAMUSCULAR; INTRAVENOUS
Status: DISCONTINUED | OUTPATIENT
Start: 2024-08-28 | End: 2024-08-28 | Stop reason: HOSPADM

## 2024-08-28 RX ORDER — SODIUM CHLORIDE 9 MG/ML
INJECTION, SOLUTION INTRAVENOUS PRN
Status: DISCONTINUED | OUTPATIENT
Start: 2024-08-28 | End: 2024-08-28 | Stop reason: HOSPADM

## 2024-08-28 RX ORDER — SODIUM CHLORIDE 0.9 % (FLUSH) 0.9 %
5-40 SYRINGE (ML) INJECTION EVERY 12 HOURS SCHEDULED
Status: DISCONTINUED | OUTPATIENT
Start: 2024-08-28 | End: 2024-08-28 | Stop reason: HOSPADM

## 2024-08-28 RX ORDER — ROCURONIUM BROMIDE 10 MG/ML
INJECTION, SOLUTION INTRAVENOUS PRN
Status: DISCONTINUED | OUTPATIENT
Start: 2024-08-28 | End: 2024-08-28 | Stop reason: SDUPTHER

## 2024-08-28 RX ORDER — GINSENG 100 MG
CAPSULE ORAL 3 TIMES DAILY
Status: DISCONTINUED | OUTPATIENT
Start: 2024-08-28 | End: 2024-09-01 | Stop reason: HOSPADM

## 2024-08-28 RX ORDER — DROPERIDOL 2.5 MG/ML
0.62 INJECTION, SOLUTION INTRAMUSCULAR; INTRAVENOUS
Status: DISCONTINUED | OUTPATIENT
Start: 2024-08-28 | End: 2024-08-28 | Stop reason: HOSPADM

## 2024-08-28 RX ORDER — SUCCINYLCHOLINE/SOD CL,ISO/PF 100 MG/5ML
SYRINGE (ML) INTRAVENOUS PRN
Status: DISCONTINUED | OUTPATIENT
Start: 2024-08-28 | End: 2024-08-28 | Stop reason: SDUPTHER

## 2024-08-28 RX ORDER — FENTANYL CITRATE 50 UG/ML
INJECTION, SOLUTION INTRAMUSCULAR; INTRAVENOUS
Status: COMPLETED
Start: 2024-08-28 | End: 2024-08-28

## 2024-08-28 RX ORDER — MAGNESIUM HYDROXIDE 1200 MG/15ML
LIQUID ORAL CONTINUOUS PRN
Status: COMPLETED | OUTPATIENT
Start: 2024-08-28 | End: 2024-08-28

## 2024-08-28 RX ORDER — GLYCOPYRROLATE 0.2 MG/ML
INJECTION INTRAMUSCULAR; INTRAVENOUS PRN
Status: DISCONTINUED | OUTPATIENT
Start: 2024-08-28 | End: 2024-08-28 | Stop reason: SDUPTHER

## 2024-08-28 RX ORDER — DIPHENHYDRAMINE HYDROCHLORIDE 50 MG/ML
12.5 INJECTION INTRAMUSCULAR; INTRAVENOUS
Status: DISCONTINUED | OUTPATIENT
Start: 2024-08-28 | End: 2024-08-28 | Stop reason: HOSPADM

## 2024-08-28 RX ORDER — MEPERIDINE HYDROCHLORIDE 50 MG/ML
12.5 INJECTION INTRAMUSCULAR; INTRAVENOUS; SUBCUTANEOUS EVERY 5 MIN PRN
Status: DISCONTINUED | OUTPATIENT
Start: 2024-08-28 | End: 2024-08-28 | Stop reason: HOSPADM

## 2024-08-28 RX ORDER — GABAPENTIN 300 MG/1
300 CAPSULE ORAL EVERY 8 HOURS SCHEDULED
Status: DISCONTINUED | OUTPATIENT
Start: 2024-08-28 | End: 2024-08-30

## 2024-08-28 RX ORDER — ONDANSETRON 2 MG/ML
INJECTION INTRAMUSCULAR; INTRAVENOUS PRN
Status: DISCONTINUED | OUTPATIENT
Start: 2024-08-28 | End: 2024-08-28 | Stop reason: SDUPTHER

## 2024-08-28 RX ORDER — MIDAZOLAM HYDROCHLORIDE 2 MG/2ML
2 INJECTION, SOLUTION INTRAMUSCULAR; INTRAVENOUS ONCE
Status: COMPLETED | OUTPATIENT
Start: 2024-08-28 | End: 2024-08-28

## 2024-08-28 RX ORDER — SODIUM CHLORIDE 0.9 % (FLUSH) 0.9 %
5-40 SYRINGE (ML) INJECTION PRN
Status: DISCONTINUED | OUTPATIENT
Start: 2024-08-28 | End: 2024-08-28 | Stop reason: HOSPADM

## 2024-08-28 RX ORDER — NEOSTIGMINE METHYLSULFATE 5 MG/5 ML
SYRINGE (ML) INTRAVENOUS PRN
Status: DISCONTINUED | OUTPATIENT
Start: 2024-08-28 | End: 2024-08-28 | Stop reason: SDUPTHER

## 2024-08-28 RX ORDER — DEXAMETHASONE SODIUM PHOSPHATE 10 MG/ML
INJECTION, SOLUTION INTRAMUSCULAR; INTRAVENOUS PRN
Status: DISCONTINUED | OUTPATIENT
Start: 2024-08-28 | End: 2024-08-28 | Stop reason: SDUPTHER

## 2024-08-28 RX ORDER — MIDAZOLAM HYDROCHLORIDE 1 MG/ML
INJECTION INTRAMUSCULAR; INTRAVENOUS
Status: COMPLETED
Start: 2024-08-28 | End: 2024-08-28

## 2024-08-28 RX ORDER — FENTANYL CITRATE 50 UG/ML
INJECTION, SOLUTION INTRAMUSCULAR; INTRAVENOUS PRN
Status: DISCONTINUED | OUTPATIENT
Start: 2024-08-28 | End: 2024-08-28 | Stop reason: SDUPTHER

## 2024-08-28 RX ORDER — METOCLOPRAMIDE HYDROCHLORIDE 5 MG/ML
10 INJECTION INTRAMUSCULAR; INTRAVENOUS
Status: DISCONTINUED | OUTPATIENT
Start: 2024-08-28 | End: 2024-08-28 | Stop reason: HOSPADM

## 2024-08-28 RX ADMIN — FENTANYL CITRATE 50 MCG: 50 INJECTION, SOLUTION INTRAMUSCULAR; INTRAVENOUS at 16:23

## 2024-08-28 RX ADMIN — OXYCODONE HYDROCHLORIDE 5 MG: 5 TABLET ORAL at 23:58

## 2024-08-28 RX ADMIN — ROCURONIUM BROMIDE 50 MG: 10 INJECTION, SOLUTION INTRAVENOUS at 18:08

## 2024-08-28 RX ADMIN — BACITRACIN: 500 OINTMENT TOPICAL at 22:02

## 2024-08-28 RX ADMIN — SODIUM CHLORIDE, POTASSIUM CHLORIDE, SODIUM LACTATE AND CALCIUM CHLORIDE: 600; 310; 30; 20 INJECTION, SOLUTION INTRAVENOUS at 18:32

## 2024-08-28 RX ADMIN — ONDANSETRON 4 MG: 2 INJECTION INTRAMUSCULAR; INTRAVENOUS at 19:45

## 2024-08-28 RX ADMIN — BUPIVACAINE HYDROCHLORIDE 20 ML: 5 INJECTION, SOLUTION EPIDURAL; INTRACAUDAL; PERINEURAL at 09:40

## 2024-08-28 RX ADMIN — ROCURONIUM BROMIDE 20 MG: 10 INJECTION, SOLUTION INTRAVENOUS at 18:25

## 2024-08-28 RX ADMIN — SODIUM CHLORIDE, POTASSIUM CHLORIDE, SODIUM LACTATE AND CALCIUM CHLORIDE: 600; 310; 30; 20 INJECTION, SOLUTION INTRAVENOUS at 08:08

## 2024-08-28 RX ADMIN — MIDAZOLAM 2 MG: 1 INJECTION INTRAMUSCULAR; INTRAVENOUS at 17:53

## 2024-08-28 RX ADMIN — METHOCARBAMOL 750 MG: 750 TABLET ORAL at 03:48

## 2024-08-28 RX ADMIN — SODIUM CHLORIDE, POTASSIUM CHLORIDE, SODIUM LACTATE AND CALCIUM CHLORIDE: 600; 310; 30; 20 INJECTION, SOLUTION INTRAVENOUS at 17:49

## 2024-08-28 RX ADMIN — ACETAMINOPHEN 1000 MG: 500 TABLET ORAL at 06:11

## 2024-08-28 RX ADMIN — FENTANYL CITRATE 100 MCG: 50 INJECTION, SOLUTION INTRAMUSCULAR; INTRAVENOUS at 17:57

## 2024-08-28 RX ADMIN — OXYCODONE HYDROCHLORIDE 5 MG: 5 TABLET ORAL at 09:23

## 2024-08-28 RX ADMIN — PROPOFOL 200 MG: 10 INJECTION, EMULSION INTRAVENOUS at 17:57

## 2024-08-28 RX ADMIN — Medication 200 MG: at 17:57

## 2024-08-28 RX ADMIN — SODIUM CHLORIDE, POTASSIUM CHLORIDE, SODIUM LACTATE AND CALCIUM CHLORIDE: 600; 310; 30; 20 INJECTION, SOLUTION INTRAVENOUS at 19:58

## 2024-08-28 RX ADMIN — SODIUM CHLORIDE, PRESERVATIVE FREE 10 ML: 5 INJECTION INTRAVENOUS at 09:15

## 2024-08-28 RX ADMIN — HYDRALAZINE HYDROCHLORIDE 10 MG: 20 INJECTION INTRAMUSCULAR; INTRAVENOUS at 22:34

## 2024-08-28 RX ADMIN — Medication 2000 MG: at 18:14

## 2024-08-28 RX ADMIN — FENTANYL CITRATE 50 MCG: 50 INJECTION, SOLUTION INTRAMUSCULAR; INTRAVENOUS at 14:38

## 2024-08-28 RX ADMIN — Medication 5 MG: at 19:43

## 2024-08-28 RX ADMIN — BUPIVACAINE 266 MG: 13.3 INJECTION, SUSPENSION, LIPOSOMAL INFILTRATION at 09:40

## 2024-08-28 RX ADMIN — GLYCOPYRROLATE 0.8 MG: 0.2 INJECTION INTRAMUSCULAR; INTRAVENOUS at 19:43

## 2024-08-28 RX ADMIN — METHOCARBAMOL 750 MG: 750 TABLET ORAL at 09:14

## 2024-08-28 RX ADMIN — LIDOCAINE HYDROCHLORIDE 50 MG: 10 INJECTION, SOLUTION EPIDURAL; INFILTRATION; INTRACAUDAL; PERINEURAL at 17:56

## 2024-08-28 RX ADMIN — GABAPENTIN 300 MG: 300 CAPSULE ORAL at 13:07

## 2024-08-28 RX ADMIN — GABAPENTIN 300 MG: 300 CAPSULE ORAL at 09:14

## 2024-08-28 RX ADMIN — MIDAZOLAM HYDROCHLORIDE 1 MG: 2 INJECTION, SOLUTION INTRAMUSCULAR; INTRAVENOUS at 14:37

## 2024-08-28 RX ADMIN — FENTANYL CITRATE 50 MCG: 50 INJECTION, SOLUTION INTRAMUSCULAR; INTRAVENOUS at 19:07

## 2024-08-28 RX ADMIN — FENTANYL CITRATE 50 MCG: 50 INJECTION, SOLUTION INTRAMUSCULAR; INTRAVENOUS at 19:37

## 2024-08-28 RX ADMIN — DEXAMETHASONE SODIUM PHOSPHATE 10 MG: 10 INJECTION, SOLUTION INTRAMUSCULAR; INTRAVENOUS at 18:25

## 2024-08-28 RX ADMIN — BUPIVACAINE 10 ML: 13.3 INJECTION, SUSPENSION, LIPOSOMAL INFILTRATION at 09:40

## 2024-08-28 RX ADMIN — MIDAZOLAM HYDROCHLORIDE 1 MG: 1 INJECTION, SOLUTION INTRAMUSCULAR; INTRAVENOUS at 14:37

## 2024-08-28 RX ADMIN — ROCURONIUM BROMIDE 10 MG: 10 INJECTION, SOLUTION INTRAVENOUS at 19:05

## 2024-08-28 RX ADMIN — ACETAMINOPHEN 1000 MG: 500 TABLET ORAL at 13:07

## 2024-08-28 ASSESSMENT — ENCOUNTER SYMPTOMS
NAUSEA: 0
VOMITING: 0
ABDOMINAL PAIN: 1
SHORTNESS OF BREATH: 0
WHEEZING: 0

## 2024-08-28 ASSESSMENT — PAIN DESCRIPTION - ORIENTATION
ORIENTATION: LEFT
ORIENTATION: RIGHT
ORIENTATION: LEFT
ORIENTATION: LEFT

## 2024-08-28 ASSESSMENT — PAIN SCALES - GENERAL
PAINLEVEL_OUTOF10: 9
PAINLEVEL_OUTOF10: 3
PAINLEVEL_OUTOF10: 4
PAINLEVEL_OUTOF10: 8
PAINLEVEL_OUTOF10: 8
PAINLEVEL_OUTOF10: 5
PAINLEVEL_OUTOF10: 8

## 2024-08-28 ASSESSMENT — PAIN DESCRIPTION - DESCRIPTORS
DESCRIPTORS: THROBBING
DESCRIPTORS: ACHING
DESCRIPTORS: DISCOMFORT
DESCRIPTORS: THROBBING
DESCRIPTORS: ACHING

## 2024-08-28 ASSESSMENT — PAIN DESCRIPTION - LOCATION
LOCATION: LEG;ANKLE
LOCATION: LEG;ANKLE
LOCATION: NECK;ARM;CHEST
LOCATION: ARM
LOCATION: CHEST

## 2024-08-28 ASSESSMENT — PAIN - FUNCTIONAL ASSESSMENT
PAIN_FUNCTIONAL_ASSESSMENT: NONE - DENIES PAIN
PAIN_FUNCTIONAL_ASSESSMENT: ACTIVITIES ARE NOT PREVENTED

## 2024-08-28 ASSESSMENT — LIFESTYLE VARIABLES: SMOKING_STATUS: 1

## 2024-08-28 NOTE — PLAN OF CARE
Patient:  Orlando Ariza  YOB: 1980     44 y.o. male    Orthopedic post-operative instructions    Orlando Ariza is a 44 y.o. male who is being seen for:    - Left radial shaft fracture s/p Open reduction internal fixation of left radial shaft fracture , DOS: 8/28/24    - No further orthopaedic surgical intervention planned at this time   - Soft dressings on Left forearm . Please maintain and keep clean and dry. Reinforce dressings as needed per nursing.  - PWB of 10 pounds or less to the left arm  - Complete post-op antibiotics: Ancef 2g q8h x2 doses  - Diet: Ok for Adult diet from ortho perspective   - Abx: post op ancef  - DVT ppx:  Okay to start chemical anticoagulation POD#1. At minimum discharge on ASA 81mg BID if medically able for 30 days post-op.   - PT/OT evaluation post-op.  - Pain control and medical management per primary  - Ice and elevate extremity for pain and swelling  - Ok to discharge   after completion of post-op antibiotics, evaluation by PT/OT, and once medically stable   - Follow up with Dr. Riley in clinic 9/12/24   -Please contact Ortho on call with any questions    Jam Justice DO  Orthopedic Surgery Resident, PGY-1  [place], [place]
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
## 2024-08-28 NOTE — PLAN OF CARE
Problem: Pain  Goal: Verbalizes/displays adequate comfort level or baseline comfort level  8/28/2024 1126 by Regina King RN  Outcome: Progressing  Flowsheets (Taken 8/28/2024 1126)  Verbalizes/displays adequate comfort level or baseline comfort level:   Encourage patient to monitor pain and request assistance   Assess pain using appropriate pain scale   Administer analgesics based on type and severity of pain and evaluate response  8/28/2024 0133 by Rosalinda Arvizu RN  Outcome: Progressing     Problem: Skin/Tissue Integrity  Goal: Absence of new skin breakdown  Description: 1.  Monitor for areas of redness and/or skin breakdown  2.  Assess vascular access sites hourly  3.  Every 4-6 hours minimum:  Change oxygen saturation probe site  4.  Every 4-6 hours:  If on nasal continuous positive airway pressure, respiratory therapy assess nares and determine need for appliance change or resting period.  8/28/2024 1126 by Regina King RN  Outcome: Progressing  8/28/2024 0133 by Rosalinda Arvizu RN  Outcome: Progressing     Problem: Safety - Adult  Goal: Free from fall injury  8/28/2024 1126 by Regina King RN  Outcome: Progressing  Flowsheets (Taken 8/28/2024 1126)  Free From Fall Injury: Instruct family/caregiver on patient safety  8/28/2024 0133 by Rosalinda Arvizu RN  Outcome: Progressing

## 2024-08-28 NOTE — PLAN OF CARE
NO ACUTE NEUROSURGICAL INTERVENTION AT THIS TIME    NEUROSURGERY TO SIGN OFF     Please contact Neurosurgery with any questions or acute changes in neurological exam    PATIENT TO FOLLOW UP IN CLINIC:  Follow-up with Neurosurgery  [facility]  [address] [facility] [facility] [address])  [address]  Call [phone] for an appointment.        Follow up in 2-3 weeks with cervical xrays  Continue collar at all times   Electronically signed by MICHAEL Zamora NP on 8/28/2024 at 2:07 PM

## 2024-08-28 NOTE — DISCHARGE INSTRUCTIONS
Spinal Fracture Discharge Instructions     Thank you for choosing [facility] and [facility] for your recovery needs. The following instructions will help to ensure your comfort and that you are well prepared for recovery.    Follow-up Visit:   The office is located at:    [facility]    [facility] [address], [address], [place] [address]    [phone]      [x] Please obtain upright x-rays of your Cervical spine 1-2 days prior to appointment.  Please also call your primary care physician to schedule an appointment for further evaluation and care.       Diet:   You may resume your regular diet.   Be sure to eat a well-balanced diet. Protein promotes wound healing.   Pain medication and decreased activity can cause constipation. Drink 8-10 glasses of water a day, eat fresh fruits and vegetables, and add prunes, raisins and bran cereals to your diet if you do become constipated. A stool softener taken 1-2 times a day is helpful. Dulcolax suppositories or Fleets enemas are also available without a prescription. Call our office if the problem continues.     Activity and Exercise:   No driving until you are seen in the office.   Avoid riding in a car for the first two weeks if possible, until you come to the office for your scheduled follow-up.   Start taking short, frequent walks in the beginning. Bradley, more frequent walks throughout the day are more beneficial than one long walk each day. You may gradually increase the distance; as tolerated. Your brace will help give support to your muscles while you walk.   If your pain increases, you may be walking too much or too far. Try backing off for a day or two and then resume slowly.   No lifting greater than 5 lbs (gallon of milk). No bending at the waist. Instead, bend at the knees and squat to pick something up.   If physical therapy has been prescribed, you are not

## 2024-08-28 NOTE — ANESTHESIA PROCEDURE NOTES
Peripheral Block    Patient location during procedure: patient floor  Reason for block: post-op pain management and at surgeon's request  Start time: 8/28/2024 9:40 AM  End time: 8/28/2024 9:50 AM  Staffing  Performed: anesthesiologist   Anesthesiologist: Pilo Herrera MD  Performed by: Pilo Herrera MD  Authorized by: Pilo Herrera MD    Preanesthetic Checklist  Completed: patient identified, IV checked, site marked, risks and benefits discussed, surgical/procedural consents, equipment checked, pre-op evaluation, timeout performed, anesthesia consent given, oxygen available, monitors applied/VS acknowledged, fire risk safety assessment completed and verbalized and blood product R/B/A discussed and consented  Peripheral Block   Patient position: sitting  Prep: ChloraPrep  Provider prep: mask and sterile gloves  Patient monitoring: cardiac monitor, continuous pulse ox, frequent blood pressure checks, IV access, oxygen and responsive to questions  Block type: Erector spinae  Laterality: left  Injection technique: single-shot  Guidance: paresthesia technique and ultrasound guided  Local infiltration: lidocaine  Infiltration strength: 1 %  Local infiltration: lidocaine  Dose: 5 mLDose: 5 mL    Needle   Needle type: short-bevel   Needle gauge: 20 G  Needle localization: ultrasound guidance  Catheter size: 18 G  Test dose: negative  Needle length: 10 cm  Assessment   Injection assessment: negative aspiration for heme, no paresthesia on injection and no intravascular symptoms  Paresthesia pain: none  Slow fractionated injection: yes  Hemodynamics: stable  Outcomes: patient tolerated procedure well and uncomplicated    Medications Administered  BUPivacaine (MARCAINE) PF injection 0.5% - Perineural   20 mL - 8/28/2024 9:40:00 AM  BUPivacaine liposome (EXPAREL) injection 1.3% - Perineural   10 mL - 8/28/2024 9:40:00 AM

## 2024-08-28 NOTE — CARE COORDINATION
SBIRT-  Met with pt with girlfriend present.  Pt states he has a medical marijuana card. Denies any other substance use.  Drinks alcohol socially  No previous rehab.  Pt also denies having any SI/depression              Alcohol Screening and Brief Intervention        No results for input(s): \"ALC\" in the last 72 hours.    Alcohol Pre-screening  (MEN ONLY) How many times in the past year have you had 5 or more drinks in a day?: None          Drug Pre-Screening none       Drug Screening DAST       Mood Pre-Screening (PHQ-2)  During the past 2 weeks, have you been bothered by, feeling down, depressed or hopeless?  No        I have interviewed Orlando Ariza, [id number] regarding  His alcohol consumption/drug use and risk for excessive use. Screenings were negative.  Patient  N/A intervention at this time.     Deferred []    Completed on: 8/28/2024   DALIA ZELAYA

## 2024-08-29 ENCOUNTER — APPOINTMENT (OUTPATIENT)
Dept: GENERAL RADIOLOGY | Age: 44
DRG: 510 | End: 2024-08-29
Payer: MEDICAID

## 2024-08-29 LAB
ANION GAP SERPL CALCULATED.3IONS-SCNC: 8 MMOL/L (ref 9–16)
BASOPHILS # BLD: 0 K/UL (ref 0–0.2)
BASOPHILS NFR BLD: 0 % (ref 0–2)
BUN SERPL-MCNC: 9 MG/DL (ref 6–20)
CALCIUM SERPL-MCNC: 8.9 MG/DL (ref 8.6–10.4)
CHLORIDE SERPL-SCNC: 101 MMOL/L (ref 98–107)
CO2 SERPL-SCNC: 25 MMOL/L (ref 20–31)
CREAT SERPL-MCNC: 0.9 MG/DL (ref 0.7–1.2)
EOSINOPHIL # BLD: 0 K/UL (ref 0–0.4)
EOSINOPHILS RELATIVE PERCENT: 0 % (ref 1–4)
ERYTHROCYTE [DISTWIDTH] IN BLOOD BY AUTOMATED COUNT: 12.5 % (ref 11.8–14.4)
GFR, ESTIMATED: >90 ML/MIN/1.73M2
GLUCOSE SERPL-MCNC: 138 MG/DL (ref 74–99)
HCT VFR BLD AUTO: 38.5 % (ref 40.7–50.3)
HGB BLD-MCNC: 12.8 G/DL (ref 13–17)
IMM GRANULOCYTES # BLD AUTO: 0 K/UL (ref 0–0.3)
IMM GRANULOCYTES NFR BLD: 0 %
LYMPHOCYTES NFR BLD: 2.81 K/UL (ref 1–4.8)
LYMPHOCYTES RELATIVE PERCENT: 15 % (ref 24–44)
MCH RBC QN AUTO: 31.5 PG (ref 25.2–33.5)
MCHC RBC AUTO-ENTMCNC: 33.2 G/DL (ref 28.4–34.8)
MCV RBC AUTO: 94.8 FL (ref 82.6–102.9)
MONOCYTES NFR BLD: 0.75 K/UL (ref 0.1–0.8)
MONOCYTES NFR BLD: 4 % (ref 1–7)
MORPHOLOGY: NORMAL
NEUTROPHILS NFR BLD: 81 % (ref 36–66)
NEUTS SEG NFR BLD: 15.14 K/UL (ref 1.8–7.7)
NRBC BLD-RTO: 0 PER 100 WBC
PLATELET # BLD AUTO: 386 K/UL (ref 138–453)
PMV BLD AUTO: 10.1 FL (ref 8.1–13.5)
POTASSIUM SERPL-SCNC: 4.5 MMOL/L (ref 3.7–5.3)
RBC # BLD AUTO: 4.06 M/UL (ref 4.21–5.77)
SODIUM SERPL-SCNC: 134 MMOL/L (ref 136–145)
WBC OTHER # BLD: 18.7 K/UL (ref 3.5–11.3)

## 2024-08-29 PROCEDURE — 85025 COMPLETE CBC W/AUTO DIFF WBC: CPT

## 2024-08-29 PROCEDURE — 99232 SBSQ HOSP IP/OBS MODERATE 35: CPT | Performed by: SURGERY

## 2024-08-29 PROCEDURE — 94761 N-INVAS EAR/PLS OXIMETRY MLT: CPT

## 2024-08-29 PROCEDURE — 6360000002 HC RX W HCPCS

## 2024-08-29 PROCEDURE — 97166 OT EVAL MOD COMPLEX 45 MIN: CPT

## 2024-08-29 PROCEDURE — 36415 COLL VENOUS BLD VENIPUNCTURE: CPT

## 2024-08-29 PROCEDURE — 6370000000 HC RX 637 (ALT 250 FOR IP)

## 2024-08-29 PROCEDURE — 80048 BASIC METABOLIC PNL TOTAL CA: CPT

## 2024-08-29 PROCEDURE — 94640 AIRWAY INHALATION TREATMENT: CPT

## 2024-08-29 PROCEDURE — 2060000000 HC ICU INTERMEDIATE R&B

## 2024-08-29 PROCEDURE — 73600 X-RAY EXAM OF ANKLE: CPT

## 2024-08-29 PROCEDURE — 2580000003 HC RX 258

## 2024-08-29 PROCEDURE — 6360000002 HC RX W HCPCS: Performed by: STUDENT IN AN ORGANIZED HEALTH CARE EDUCATION/TRAINING PROGRAM

## 2024-08-29 PROCEDURE — 6370000000 HC RX 637 (ALT 250 FOR IP): Performed by: STUDENT IN AN ORGANIZED HEALTH CARE EDUCATION/TRAINING PROGRAM

## 2024-08-29 PROCEDURE — 2700000000 HC OXYGEN THERAPY PER DAY

## 2024-08-29 PROCEDURE — 71045 X-RAY EXAM CHEST 1 VIEW: CPT

## 2024-08-29 PROCEDURE — 97162 PT EVAL MOD COMPLEX 30 MIN: CPT

## 2024-08-29 PROCEDURE — 97530 THERAPEUTIC ACTIVITIES: CPT

## 2024-08-29 PROCEDURE — 97535 SELF CARE MNGMENT TRAINING: CPT

## 2024-08-29 RX ORDER — OXYCODONE HYDROCHLORIDE 5 MG/1
10 TABLET ORAL EVERY 4 HOURS PRN
Status: DISCONTINUED | OUTPATIENT
Start: 2024-08-29 | End: 2024-09-01

## 2024-08-29 RX ORDER — ENOXAPARIN SODIUM 100 MG/ML
40 INJECTION SUBCUTANEOUS 2 TIMES DAILY
Status: DISCONTINUED | OUTPATIENT
Start: 2024-08-29 | End: 2024-09-01 | Stop reason: HOSPADM

## 2024-08-29 RX ORDER — KETOROLAC TROMETHAMINE 15 MG/ML
15 INJECTION, SOLUTION INTRAMUSCULAR; INTRAVENOUS EVERY 6 HOURS
Status: DISCONTINUED | OUTPATIENT
Start: 2024-08-29 | End: 2024-09-01

## 2024-08-29 RX ORDER — IPRATROPIUM BROMIDE AND ALBUTEROL SULFATE 2.5; .5 MG/3ML; MG/3ML
1 SOLUTION RESPIRATORY (INHALATION)
Status: DISCONTINUED | OUTPATIENT
Start: 2024-08-29 | End: 2024-09-01

## 2024-08-29 RX ORDER — OXYCODONE HYDROCHLORIDE 5 MG/1
5 TABLET ORAL EVERY 4 HOURS PRN
Status: DISCONTINUED | OUTPATIENT
Start: 2024-08-29 | End: 2024-09-01

## 2024-08-29 RX ADMIN — IPRATROPIUM BROMIDE AND ALBUTEROL SULFATE 1 DOSE: .5; 2.5 SOLUTION RESPIRATORY (INHALATION) at 15:06

## 2024-08-29 RX ADMIN — GABAPENTIN 300 MG: 300 CAPSULE ORAL at 21:14

## 2024-08-29 RX ADMIN — OXYCODONE HYDROCHLORIDE 10 MG: 5 TABLET ORAL at 21:13

## 2024-08-29 RX ADMIN — BACITRACIN: 500 OINTMENT TOPICAL at 21:14

## 2024-08-29 RX ADMIN — SODIUM CHLORIDE, PRESERVATIVE FREE 10 ML: 5 INJECTION INTRAVENOUS at 21:14

## 2024-08-29 RX ADMIN — ACETAMINOPHEN 1000 MG: 500 TABLET ORAL at 14:37

## 2024-08-29 RX ADMIN — ACETAMINOPHEN 1000 MG: 500 TABLET ORAL at 21:13

## 2024-08-29 RX ADMIN — BACITRACIN: 500 OINTMENT TOPICAL at 16:18

## 2024-08-29 RX ADMIN — KETOROLAC TROMETHAMINE 15 MG: 15 INJECTION, SOLUTION INTRAMUSCULAR; INTRAVENOUS at 08:56

## 2024-08-29 RX ADMIN — ENOXAPARIN SODIUM 40 MG: 100 INJECTION SUBCUTANEOUS at 08:58

## 2024-08-29 RX ADMIN — KETOROLAC TROMETHAMINE 15 MG: 15 INJECTION, SOLUTION INTRAMUSCULAR; INTRAVENOUS at 14:37

## 2024-08-29 RX ADMIN — KETOROLAC TROMETHAMINE 15 MG: 15 INJECTION, SOLUTION INTRAMUSCULAR; INTRAVENOUS at 21:13

## 2024-08-29 RX ADMIN — ENOXAPARIN SODIUM 40 MG: 100 INJECTION SUBCUTANEOUS at 21:13

## 2024-08-29 RX ADMIN — SODIUM CHLORIDE, POTASSIUM CHLORIDE, SODIUM LACTATE AND CALCIUM CHLORIDE: 600; 310; 30; 20 INJECTION, SOLUTION INTRAVENOUS at 00:05

## 2024-08-29 RX ADMIN — SODIUM CHLORIDE, PRESERVATIVE FREE 10 ML: 5 INJECTION INTRAVENOUS at 09:07

## 2024-08-29 RX ADMIN — ACETAMINOPHEN 1000 MG: 500 TABLET ORAL at 05:28

## 2024-08-29 RX ADMIN — OXYCODONE HYDROCHLORIDE 10 MG: 5 TABLET ORAL at 13:20

## 2024-08-29 RX ADMIN — BACITRACIN: 500 OINTMENT TOPICAL at 08:52

## 2024-08-29 RX ADMIN — Medication 2000 MG: at 01:11

## 2024-08-29 RX ADMIN — METHOCARBAMOL 750 MG: 750 TABLET ORAL at 21:14

## 2024-08-29 RX ADMIN — OXYCODONE HYDROCHLORIDE 10 MG: 5 TABLET ORAL at 08:56

## 2024-08-29 RX ADMIN — METHOCARBAMOL 750 MG: 750 TABLET ORAL at 08:53

## 2024-08-29 RX ADMIN — GABAPENTIN 300 MG: 300 CAPSULE ORAL at 14:37

## 2024-08-29 RX ADMIN — IPRATROPIUM BROMIDE AND ALBUTEROL SULFATE 1 DOSE: .5; 2.5 SOLUTION RESPIRATORY (INHALATION) at 20:32

## 2024-08-29 RX ADMIN — IPRATROPIUM BROMIDE AND ALBUTEROL SULFATE 1 DOSE: .5; 2.5 SOLUTION RESPIRATORY (INHALATION) at 08:40

## 2024-08-29 RX ADMIN — METHOCARBAMOL 750 MG: 750 TABLET ORAL at 14:37

## 2024-08-29 RX ADMIN — Medication 2000 MG: at 16:18

## 2024-08-29 RX ADMIN — Medication 2000 MG: at 08:59

## 2024-08-29 RX ADMIN — METHOCARBAMOL 750 MG: 750 TABLET ORAL at 04:12

## 2024-08-29 RX ADMIN — GABAPENTIN 300 MG: 300 CAPSULE ORAL at 05:28

## 2024-08-29 ASSESSMENT — PAIN DESCRIPTION - DESCRIPTORS
DESCRIPTORS: DISCOMFORT

## 2024-08-29 ASSESSMENT — PAIN DESCRIPTION - LOCATION
LOCATION: ARM;SHOULDER
LOCATION: ARM;CHEST
LOCATION: ARM
LOCATION: CHEST;ANKLE

## 2024-08-29 ASSESSMENT — PAIN SCALES - GENERAL
PAINLEVEL_OUTOF10: 3
PAINLEVEL_OUTOF10: 6
PAINLEVEL_OUTOF10: 9
PAINLEVEL_OUTOF10: 4
PAINLEVEL_OUTOF10: 5
PAINLEVEL_OUTOF10: 6
PAINLEVEL_OUTOF10: 10
PAINLEVEL_OUTOF10: 9

## 2024-08-29 ASSESSMENT — PAIN DESCRIPTION - ORIENTATION
ORIENTATION: LEFT
ORIENTATION: LEFT

## 2024-08-29 ASSESSMENT — PAIN - FUNCTIONAL ASSESSMENT: PAIN_FUNCTIONAL_ASSESSMENT: ACTIVITIES ARE NOT PREVENTED

## 2024-08-29 NOTE — PLAN OF CARE
Problem: Respiratory - Adult  Goal: Achieves optimal ventilation and oxygenation  Outcome: Progressing  Flowsheets (Taken 8/29/2024 6619)  Achieves optimal ventilation and oxygenation:   Assess for changes in mentation and behavior   Assess for changes in respiratory status   Oxygen supplementation based on oxygen saturation or arterial blood gases   Position to facilitate oxygenation and minimize respiratory effort   Encourage broncho-pulmonary hygiene including cough, deep breathe, incentive spirometry   Assess and instruct to report shortness of breath or any respiratory difficulty   Respiratory therapy support as indicated
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
## 2024-08-30 ENCOUNTER — APPOINTMENT (OUTPATIENT)
Dept: GENERAL RADIOLOGY | Age: 44
DRG: 510 | End: 2024-08-30
Payer: MEDICAID

## 2024-08-30 LAB
ANION GAP SERPL CALCULATED.3IONS-SCNC: 10 MMOL/L (ref 9–16)
BASOPHILS # BLD: 0.04 K/UL (ref 0–0.2)
BASOPHILS NFR BLD: 0 % (ref 0–2)
BUN SERPL-MCNC: 12 MG/DL (ref 6–20)
CALCIUM SERPL-MCNC: 8.9 MG/DL (ref 8.6–10.4)
CHLORIDE SERPL-SCNC: 103 MMOL/L (ref 98–107)
CO2 SERPL-SCNC: 26 MMOL/L (ref 20–31)
CREAT SERPL-MCNC: 0.9 MG/DL (ref 0.7–1.2)
EKG ATRIAL RATE: 71 BPM
EKG ATRIAL RATE: 77 BPM
EKG P AXIS: 31 DEGREES
EKG P AXIS: 40 DEGREES
EKG P-R INTERVAL: 172 MS
EKG P-R INTERVAL: 182 MS
EKG Q-T INTERVAL: 364 MS
EKG Q-T INTERVAL: 374 MS
EKG QRS DURATION: 98 MS
EKG QRS DURATION: 98 MS
EKG QTC CALCULATION (BAZETT): 406 MS
EKG QTC CALCULATION (BAZETT): 411 MS
EKG R AXIS: 39 DEGREES
EKG R AXIS: 41 DEGREES
EKG T AXIS: -4 DEGREES
EKG T AXIS: -5 DEGREES
EKG VENTRICULAR RATE: 71 BPM
EKG VENTRICULAR RATE: 77 BPM
EOSINOPHIL # BLD: 0.17 K/UL (ref 0–0.44)
EOSINOPHILS RELATIVE PERCENT: 1 % (ref 1–4)
ERYTHROCYTE [DISTWIDTH] IN BLOOD BY AUTOMATED COUNT: 13 % (ref 11.8–14.4)
GFR, ESTIMATED: >90 ML/MIN/1.73M2
GLUCOSE SERPL-MCNC: 122 MG/DL (ref 74–99)
HCT VFR BLD AUTO: 37.9 % (ref 40.7–50.3)
HGB BLD-MCNC: 12.5 G/DL (ref 13–17)
IMM GRANULOCYTES # BLD AUTO: 0.06 K/UL (ref 0–0.3)
IMM GRANULOCYTES NFR BLD: 1 %
LYMPHOCYTES NFR BLD: 3.5 K/UL (ref 1.1–3.7)
LYMPHOCYTES RELATIVE PERCENT: 27 % (ref 24–43)
MCH RBC QN AUTO: 31.6 PG (ref 25.2–33.5)
MCHC RBC AUTO-ENTMCNC: 33 G/DL (ref 28.4–34.8)
MCV RBC AUTO: 95.9 FL (ref 82.6–102.9)
MONOCYTES NFR BLD: 1.46 K/UL (ref 0.1–1.2)
MONOCYTES NFR BLD: 11 % (ref 3–12)
NEUTROPHILS NFR BLD: 60 % (ref 36–65)
NEUTS SEG NFR BLD: 7.75 K/UL (ref 1.5–8.1)
NRBC BLD-RTO: 0 PER 100 WBC
PLATELET # BLD AUTO: 348 K/UL (ref 138–453)
PMV BLD AUTO: 9.8 FL (ref 8.1–13.5)
POTASSIUM SERPL-SCNC: 4 MMOL/L (ref 3.7–5.3)
RBC # BLD AUTO: 3.95 M/UL (ref 4.21–5.77)
SODIUM SERPL-SCNC: 139 MMOL/L (ref 136–145)
WBC OTHER # BLD: 13 K/UL (ref 3.5–11.3)

## 2024-08-30 PROCEDURE — 6360000002 HC RX W HCPCS: Performed by: STUDENT IN AN ORGANIZED HEALTH CARE EDUCATION/TRAINING PROGRAM

## 2024-08-30 PROCEDURE — 2060000000 HC ICU INTERMEDIATE R&B

## 2024-08-30 PROCEDURE — 36415 COLL VENOUS BLD VENIPUNCTURE: CPT

## 2024-08-30 PROCEDURE — 6370000000 HC RX 637 (ALT 250 FOR IP)

## 2024-08-30 PROCEDURE — 2500000003 HC RX 250 WO HCPCS: Performed by: STUDENT IN AN ORGANIZED HEALTH CARE EDUCATION/TRAINING PROGRAM

## 2024-08-30 PROCEDURE — 6370000000 HC RX 637 (ALT 250 FOR IP): Performed by: STUDENT IN AN ORGANIZED HEALTH CARE EDUCATION/TRAINING PROGRAM

## 2024-08-30 PROCEDURE — 94640 AIRWAY INHALATION TREATMENT: CPT

## 2024-08-30 PROCEDURE — 71045 X-RAY EXAM CHEST 1 VIEW: CPT

## 2024-08-30 PROCEDURE — 85025 COMPLETE CBC W/AUTO DIFF WBC: CPT

## 2024-08-30 PROCEDURE — 2580000003 HC RX 258

## 2024-08-30 PROCEDURE — 2700000000 HC OXYGEN THERAPY PER DAY

## 2024-08-30 PROCEDURE — 93005 ELECTROCARDIOGRAM TRACING: CPT

## 2024-08-30 PROCEDURE — 80048 BASIC METABOLIC PNL TOTAL CA: CPT

## 2024-08-30 PROCEDURE — 99232 SBSQ HOSP IP/OBS MODERATE 35: CPT | Performed by: SURGERY

## 2024-08-30 PROCEDURE — 93005 ELECTROCARDIOGRAM TRACING: CPT | Performed by: SURGERY

## 2024-08-30 RX ORDER — LIDOCAINE 4 G/G
1 PATCH TOPICAL DAILY
Status: DISCONTINUED | OUTPATIENT
Start: 2024-08-30 | End: 2024-09-01 | Stop reason: HOSPADM

## 2024-08-30 RX ORDER — GABAPENTIN 600 MG/1
600 TABLET ORAL EVERY 8 HOURS SCHEDULED
Status: DISCONTINUED | OUTPATIENT
Start: 2024-08-30 | End: 2024-09-01 | Stop reason: HOSPADM

## 2024-08-30 RX ORDER — QUETIAPINE FUMARATE 25 MG/1
50 TABLET, FILM COATED ORAL 2 TIMES DAILY
Status: DISCONTINUED | OUTPATIENT
Start: 2024-08-30 | End: 2024-09-01 | Stop reason: HOSPADM

## 2024-08-30 RX ORDER — LIDOCAINE 4 G/G
1 PATCH TOPICAL DAILY
Status: DISCONTINUED | OUTPATIENT
Start: 2024-08-30 | End: 2024-08-30

## 2024-08-30 RX ADMIN — GABAPENTIN 300 MG: 300 CAPSULE ORAL at 06:02

## 2024-08-30 RX ADMIN — OXYCODONE HYDROCHLORIDE 10 MG: 5 TABLET ORAL at 01:16

## 2024-08-30 RX ADMIN — SODIUM CHLORIDE, PRESERVATIVE FREE 10 ML: 5 INJECTION INTRAVENOUS at 08:09

## 2024-08-30 RX ADMIN — SODIUM CHLORIDE, PRESERVATIVE FREE 10 ML: 5 INJECTION INTRAVENOUS at 21:08

## 2024-08-30 RX ADMIN — OXYCODONE HYDROCHLORIDE 10 MG: 5 TABLET ORAL at 16:41

## 2024-08-30 RX ADMIN — ACETAMINOPHEN 1000 MG: 500 TABLET ORAL at 14:10

## 2024-08-30 RX ADMIN — GABAPENTIN 600 MG: 600 TABLET ORAL at 21:08

## 2024-08-30 RX ADMIN — KETOROLAC TROMETHAMINE 15 MG: 15 INJECTION, SOLUTION INTRAMUSCULAR; INTRAVENOUS at 21:07

## 2024-08-30 RX ADMIN — GABAPENTIN 600 MG: 600 TABLET ORAL at 14:10

## 2024-08-30 RX ADMIN — IPRATROPIUM BROMIDE AND ALBUTEROL SULFATE 1 DOSE: .5; 2.5 SOLUTION RESPIRATORY (INHALATION) at 14:53

## 2024-08-30 RX ADMIN — ACETAMINOPHEN 1000 MG: 500 TABLET ORAL at 21:08

## 2024-08-30 RX ADMIN — IPRATROPIUM BROMIDE AND ALBUTEROL SULFATE 1 DOSE: .5; 2.5 SOLUTION RESPIRATORY (INHALATION) at 21:49

## 2024-08-30 RX ADMIN — KETOROLAC TROMETHAMINE 15 MG: 15 INJECTION, SOLUTION INTRAMUSCULAR; INTRAVENOUS at 03:22

## 2024-08-30 RX ADMIN — QUETIAPINE FUMARATE 50 MG: 25 TABLET ORAL at 10:26

## 2024-08-30 RX ADMIN — QUETIAPINE FUMARATE 50 MG: 25 TABLET ORAL at 21:08

## 2024-08-30 RX ADMIN — ACETAMINOPHEN 1000 MG: 500 TABLET ORAL at 06:02

## 2024-08-30 RX ADMIN — METHOCARBAMOL 750 MG: 750 TABLET ORAL at 21:08

## 2024-08-30 RX ADMIN — Medication 5 MG: at 01:16

## 2024-08-30 RX ADMIN — KETOROLAC TROMETHAMINE 15 MG: 15 INJECTION, SOLUTION INTRAMUSCULAR; INTRAVENOUS at 14:11

## 2024-08-30 RX ADMIN — Medication 0.2 MG/KG/HR: at 10:24

## 2024-08-30 RX ADMIN — BACITRACIN: 500 OINTMENT TOPICAL at 14:11

## 2024-08-30 RX ADMIN — ENOXAPARIN SODIUM 40 MG: 100 INJECTION SUBCUTANEOUS at 08:08

## 2024-08-30 RX ADMIN — METHOCARBAMOL 750 MG: 750 TABLET ORAL at 08:08

## 2024-08-30 RX ADMIN — BACITRACIN: 500 OINTMENT TOPICAL at 10:26

## 2024-08-30 RX ADMIN — METHOCARBAMOL 750 MG: 750 TABLET ORAL at 04:29

## 2024-08-30 RX ADMIN — OXYCODONE HYDROCHLORIDE 10 MG: 5 TABLET ORAL at 07:38

## 2024-08-30 RX ADMIN — ENOXAPARIN SODIUM 40 MG: 100 INJECTION SUBCUTANEOUS at 21:07

## 2024-08-30 RX ADMIN — BACITRACIN: 500 OINTMENT TOPICAL at 21:09

## 2024-08-30 RX ADMIN — HYDROMORPHONE HYDROCHLORIDE 1 MG: 1 INJECTION, SOLUTION INTRAMUSCULAR; INTRAVENOUS; SUBCUTANEOUS at 08:21

## 2024-08-30 RX ADMIN — METHOCARBAMOL 750 MG: 750 TABLET ORAL at 14:11

## 2024-08-30 RX ADMIN — IPRATROPIUM BROMIDE AND ALBUTEROL SULFATE 1 DOSE: .5; 2.5 SOLUTION RESPIRATORY (INHALATION) at 07:48

## 2024-08-30 RX ADMIN — KETOROLAC TROMETHAMINE 15 MG: 15 INJECTION, SOLUTION INTRAMUSCULAR; INTRAVENOUS at 08:08

## 2024-08-30 ASSESSMENT — PAIN - FUNCTIONAL ASSESSMENT: PAIN_FUNCTIONAL_ASSESSMENT: ACTIVITIES ARE NOT PREVENTED

## 2024-08-30 ASSESSMENT — PAIN SCALES - GENERAL
PAINLEVEL_OUTOF10: 7
PAINLEVEL_OUTOF10: 5
PAINLEVEL_OUTOF10: 10
PAINLEVEL_OUTOF10: 4
PAINLEVEL_OUTOF10: 10
PAINLEVEL_OUTOF10: 8
PAINLEVEL_OUTOF10: 5
PAINLEVEL_OUTOF10: 10

## 2024-08-30 ASSESSMENT — PAIN DESCRIPTION - ORIENTATION
ORIENTATION: RIGHT
ORIENTATION: LEFT
ORIENTATION: RIGHT

## 2024-08-30 ASSESSMENT — PAIN DESCRIPTION - LOCATION
LOCATION: RIB CAGE
LOCATION: SHOULDER
LOCATION: SHOULDER
LOCATION: GENERALIZED

## 2024-08-30 ASSESSMENT — PAIN DESCRIPTION - ONSET: ONSET: GRADUAL

## 2024-08-30 ASSESSMENT — PAIN DESCRIPTION - DESCRIPTORS
DESCRIPTORS: DISCOMFORT
DESCRIPTORS: ACHING;DISCOMFORT
DESCRIPTORS: ACHING

## 2024-08-30 NOTE — PLAN OF CARE
Problem: Pain  Goal: Verbalizes/displays adequate comfort level or baseline comfort level  8/30/2024 1647 by Ignacio Blake RN  Outcome: Progressing  8/30/2024 0607 by Lou Howard RN  Outcome: Progressing     Problem: Skin/Tissue Integrity  Goal: Absence of new skin breakdown  Description: 1.  Monitor for areas of redness and/or skin breakdown  2.  Assess vascular access sites hourly  3.  Every 4-6 hours minimum:  Change oxygen saturation probe site  4.  Every 4-6 hours:  If on nasal continuous positive airway pressure, respiratory therapy assess nares and determine need for appliance change or resting period.  8/30/2024 1647 by Ignacio Blake RN  Outcome: Progressing  8/30/2024 0607 by Lou Howard RN  Outcome: Progressing     Problem: Safety - Adult  Goal: Free from fall injury  8/30/2024 1647 by Ignacio Blake RN  Outcome: Progressing  8/30/2024 0607 by Lou Howard RN  Outcome: Progressing     Problem: Discharge Planning  Goal: Discharge to home or other facility with appropriate resources  8/30/2024 1647 by Ignacio Blake RN  Outcome: Progressing  8/30/2024 0607 by Lou Howard RN  Outcome: Progressing     Problem: ABCDS Injury Assessment  Goal: Absence of physical injury  8/30/2024 1647 by Ignacio Blake RN  Outcome: Progressing  8/30/2024 0607 by Lou Howard RN  Outcome: Progressing     Problem: Respiratory - Adult  Goal: Achieves optimal ventilation and oxygenation  8/30/2024 1647 by Ignacio Blake RN  Outcome: Progressing  8/30/2024 0752 by Ibis Lemos RCP  Outcome: Progressing  Flowsheets (Taken 8/30/2024 0749)  Achieves optimal ventilation and oxygenation:   Assess for changes in respiratory status   Assess for changes in mentation and behavior   Position to facilitate oxygenation and minimize respiratory effort   Oxygen supplementation based on oxygen saturation or arterial blood gases   Encourage broncho-pulmonary hygiene including cough, deep breathe,

## 2024-08-30 NOTE — PLAN OF CARE
Problem: Respiratory - Adult  Goal: Achieves optimal ventilation and oxygenation  8/30/2024 0752 by Ibis Lemos, ALEX  Outcome: Progressing  Flowsheets (Taken 8/30/2024 0715)  Achieves optimal ventilation and oxygenation:   Assess for changes in respiratory status   Assess for changes in mentation and behavior   Position to facilitate oxygenation and minimize respiratory effort   Oxygen supplementation based on oxygen saturation or arterial blood gases   Encourage broncho-pulmonary hygiene including cough, deep breathe, incentive spirometry   Assess and instruct to report shortness of breath or any respiratory difficulty   Respiratory therapy support as indicated

## 2024-08-30 NOTE — PLAN OF CARE
Problem: Pain  Goal: Verbalizes/displays adequate comfort level or baseline comfort level  Outcome: Progressing     Problem: Skin/Tissue Integrity  Goal: Absence of new skin breakdown  Description: 1.  Monitor for areas of redness and/or skin breakdown  2.  Assess vascular access sites hourly  3.  Every 4-6 hours minimum:  Change oxygen saturation probe site  4.  Every 4-6 hours:  If on nasal continuous positive airway pressure, respiratory therapy assess nares and determine need for appliance change or resting period.  Outcome: Progressing     Problem: Safety - Adult  Goal: Free from fall injury  Outcome: Progressing     Problem: Discharge Planning  Goal: Discharge to home or other facility with appropriate resources  Outcome: Progressing     Problem: ABCDS Injury Assessment  Goal: Absence of physical injury  Outcome: Progressing

## 2024-08-31 ENCOUNTER — APPOINTMENT (OUTPATIENT)
Dept: GENERAL RADIOLOGY | Age: 44
DRG: 510 | End: 2024-08-31
Payer: MEDICAID
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
## 2024-08-31 PROCEDURE — 97116 GAIT TRAINING THERAPY: CPT

## 2024-08-31 PROCEDURE — 2060000000 HC ICU INTERMEDIATE R&B

## 2024-08-31 PROCEDURE — 2700000000 HC OXYGEN THERAPY PER DAY

## 2024-08-31 PROCEDURE — 2500000003 HC RX 250 WO HCPCS: Performed by: STUDENT IN AN ORGANIZED HEALTH CARE EDUCATION/TRAINING PROGRAM

## 2024-08-31 PROCEDURE — 6370000000 HC RX 637 (ALT 250 FOR IP): Performed by: STUDENT IN AN ORGANIZED HEALTH CARE EDUCATION/TRAINING PROGRAM

## 2024-08-31 PROCEDURE — 94761 N-INVAS EAR/PLS OXIMETRY MLT: CPT

## 2024-08-31 PROCEDURE — 6370000000 HC RX 637 (ALT 250 FOR IP)

## 2024-08-31 PROCEDURE — 6360000002 HC RX W HCPCS: Performed by: STUDENT IN AN ORGANIZED HEALTH CARE EDUCATION/TRAINING PROGRAM

## 2024-08-31 PROCEDURE — 94640 AIRWAY INHALATION TREATMENT: CPT

## 2024-08-31 PROCEDURE — 71045 X-RAY EXAM CHEST 1 VIEW: CPT

## 2024-08-31 PROCEDURE — 97110 THERAPEUTIC EXERCISES: CPT

## 2024-08-31 PROCEDURE — 2580000003 HC RX 258

## 2024-08-31 RX ADMIN — BACITRACIN: 500 OINTMENT TOPICAL at 14:40

## 2024-08-31 RX ADMIN — KETOROLAC TROMETHAMINE 15 MG: 15 INJECTION, SOLUTION INTRAMUSCULAR; INTRAVENOUS at 14:40

## 2024-08-31 RX ADMIN — OXYCODONE HYDROCHLORIDE 5 MG: 5 TABLET ORAL at 11:08

## 2024-08-31 RX ADMIN — KETOROLAC TROMETHAMINE 15 MG: 15 INJECTION, SOLUTION INTRAMUSCULAR; INTRAVENOUS at 20:59

## 2024-08-31 RX ADMIN — IPRATROPIUM BROMIDE AND ALBUTEROL SULFATE 1 DOSE: .5; 2.5 SOLUTION RESPIRATORY (INHALATION) at 21:06

## 2024-08-31 RX ADMIN — METHOCARBAMOL 750 MG: 750 TABLET ORAL at 02:08

## 2024-08-31 RX ADMIN — BACITRACIN: 500 OINTMENT TOPICAL at 08:30

## 2024-08-31 RX ADMIN — OXYCODONE HYDROCHLORIDE 5 MG: 5 TABLET ORAL at 00:04

## 2024-08-31 RX ADMIN — IPRATROPIUM BROMIDE AND ALBUTEROL SULFATE 1 DOSE: .5; 2.5 SOLUTION RESPIRATORY (INHALATION) at 14:07

## 2024-08-31 RX ADMIN — OXYCODONE HYDROCHLORIDE 5 MG: 5 TABLET ORAL at 05:11

## 2024-08-31 RX ADMIN — SODIUM CHLORIDE, PRESERVATIVE FREE 10 ML: 5 INJECTION INTRAVENOUS at 21:01

## 2024-08-31 RX ADMIN — METHOCARBAMOL 750 MG: 750 TABLET ORAL at 08:29

## 2024-08-31 RX ADMIN — Medication 0.2 MG/KG/HR: at 16:24

## 2024-08-31 RX ADMIN — IPRATROPIUM BROMIDE AND ALBUTEROL SULFATE 1 DOSE: .5; 2.5 SOLUTION RESPIRATORY (INHALATION) at 09:41

## 2024-08-31 RX ADMIN — ENOXAPARIN SODIUM 40 MG: 100 INJECTION SUBCUTANEOUS at 08:29

## 2024-08-31 RX ADMIN — GABAPENTIN 600 MG: 600 TABLET ORAL at 05:11

## 2024-08-31 RX ADMIN — SODIUM CHLORIDE, PRESERVATIVE FREE 10 ML: 5 INJECTION INTRAVENOUS at 08:30

## 2024-08-31 RX ADMIN — OXYCODONE HYDROCHLORIDE 5 MG: 5 TABLET ORAL at 17:16

## 2024-08-31 RX ADMIN — QUETIAPINE FUMARATE 50 MG: 25 TABLET ORAL at 20:59

## 2024-08-31 RX ADMIN — QUETIAPINE FUMARATE 50 MG: 25 TABLET ORAL at 08:29

## 2024-08-31 RX ADMIN — ACETAMINOPHEN 1000 MG: 500 TABLET ORAL at 21:47

## 2024-08-31 RX ADMIN — METHOCARBAMOL 750 MG: 750 TABLET ORAL at 14:41

## 2024-08-31 RX ADMIN — GABAPENTIN 600 MG: 600 TABLET ORAL at 21:48

## 2024-08-31 RX ADMIN — ACETAMINOPHEN 1000 MG: 500 TABLET ORAL at 14:41

## 2024-08-31 RX ADMIN — KETOROLAC TROMETHAMINE 15 MG: 15 INJECTION, SOLUTION INTRAMUSCULAR; INTRAVENOUS at 02:08

## 2024-08-31 RX ADMIN — KETOROLAC TROMETHAMINE 15 MG: 15 INJECTION, SOLUTION INTRAMUSCULAR; INTRAVENOUS at 08:29

## 2024-08-31 RX ADMIN — GABAPENTIN 600 MG: 600 TABLET ORAL at 14:41

## 2024-08-31 RX ADMIN — METHOCARBAMOL 750 MG: 750 TABLET ORAL at 20:59

## 2024-08-31 RX ADMIN — OXYCODONE HYDROCHLORIDE 10 MG: 5 TABLET ORAL at 22:51

## 2024-08-31 RX ADMIN — POLYETHYLENE GLYCOL 3350 17 G: 17 POWDER, FOR SOLUTION ORAL at 08:29

## 2024-08-31 RX ADMIN — BACITRACIN: 500 OINTMENT TOPICAL at 20:59

## 2024-08-31 RX ADMIN — ACETAMINOPHEN 1000 MG: 500 TABLET ORAL at 05:11

## 2024-08-31 ASSESSMENT — PAIN - FUNCTIONAL ASSESSMENT
PAIN_FUNCTIONAL_ASSESSMENT: ACTIVITIES ARE NOT PREVENTED
PAIN_FUNCTIONAL_ASSESSMENT: PREVENTS OR INTERFERES WITH MANY ACTIVE NOT PASSIVE ACTIVITIES

## 2024-08-31 ASSESSMENT — PAIN DESCRIPTION - ORIENTATION
ORIENTATION: LEFT
ORIENTATION: RIGHT
ORIENTATION: LEFT

## 2024-08-31 ASSESSMENT — PAIN SCALES - GENERAL
PAINLEVEL_OUTOF10: 6
PAINLEVEL_OUTOF10: 6
PAINLEVEL_OUTOF10: 2
PAINLEVEL_OUTOF10: 6
PAINLEVEL_OUTOF10: 5
PAINLEVEL_OUTOF10: 6
PAINLEVEL_OUTOF10: 4
PAINLEVEL_OUTOF10: 9
PAINLEVEL_OUTOF10: 7
PAINLEVEL_OUTOF10: 5
PAINLEVEL_OUTOF10: 6
PAINLEVEL_OUTOF10: 6
PAINLEVEL_OUTOF10: 2
PAINLEVEL_OUTOF10: 3
PAINLEVEL_OUTOF10: 9
PAINLEVEL_OUTOF10: 5
PAINLEVEL_OUTOF10: 4

## 2024-08-31 ASSESSMENT — PAIN DESCRIPTION - LOCATION
LOCATION: RIB CAGE
LOCATION: RIB CAGE;ARM

## 2024-08-31 ASSESSMENT — PAIN DESCRIPTION - DESCRIPTORS
DESCRIPTORS: ACHING
DESCRIPTORS: DISCOMFORT
DESCRIPTORS: DISCOMFORT

## 2024-08-31 ASSESSMENT — PAIN DESCRIPTION - FREQUENCY
FREQUENCY: INTERMITTENT
FREQUENCY: CONTINUOUS

## 2024-08-31 ASSESSMENT — PAIN DESCRIPTION - ONSET: ONSET: GRADUAL

## 2024-08-31 NOTE — PLAN OF CARE
Problem: Pain  Goal: Verbalizes/displays adequate comfort level or baseline comfort level  Outcome: Progressing     Problem: Skin/Tissue Integrity  Goal: Absence of new skin breakdown  Description: 1.  Monitor for areas of redness and/or skin breakdown  2.  Assess vascular access sites hourly  3.  Every 4-6 hours minimum:  Change oxygen saturation probe site  4.  Every 4-6 hours:  If on nasal continuous positive airway pressure, respiratory therapy assess nares and determine need for appliance change or resting period.  Outcome: Progressing     Problem: Safety - Adult  Goal: Free from fall injury  Outcome: Progressing  Flowsheets (Taken 8/31/2024 0746)  Free From Fall Injury: Instruct family/caregiver on patient safety     Problem: Discharge Planning  Goal: Discharge to home or other facility with appropriate resources  Outcome: Progressing     Problem: ABCDS Injury Assessment  Goal: Absence of physical injury  Outcome: Progressing  Flowsheets (Taken 8/31/2024 0746)  Absence of Physical Injury: Implement safety measures based on patient assessment     Problem: Respiratory - Adult  Goal: Achieves optimal ventilation and oxygenation  Outcome: Progressing

## 2024-08-31 NOTE — PLAN OF CARE
Problem: Pain  Goal: Verbalizes/displays adequate comfort level or baseline comfort level  8/31/2024 0503 by Argentina Gilbert RN  Outcome: Progressing  8/30/2024 1647 by Ignacio Blake RN  Outcome: Progressing     Problem: Skin/Tissue Integrity  Goal: Absence of new skin breakdown  Description: 1.  Monitor for areas of redness and/or skin breakdown  2.  Assess vascular access sites hourly  3.  Every 4-6 hours minimum:  Change oxygen saturation probe site  4.  Every 4-6 hours:  If on nasal continuous positive airway pressure, respiratory therapy assess nares and determine need for appliance change or resting period.  8/31/2024 0503 by Argentina Gilbert RN  Outcome: Progressing  8/30/2024 1647 by Ignacio Blake RN  Outcome: Progressing     Problem: Safety - Adult  Goal: Free from fall injury  8/31/2024 0503 by Argentina Gilbert RN  Outcome: Progressing  8/30/2024 1647 by Ignacio Blake RN  Outcome: Progressing     Problem: Discharge Planning  Goal: Discharge to home or other facility with appropriate resources  8/31/2024 0503 by Argentina Gilbert RN  Outcome: Progressing  8/30/2024 1647 by Ignacio Blake RN  Outcome: Progressing     Problem: ABCDS Injury Assessment  Goal: Absence of physical injury  8/31/2024 0503 by Argentina Gilbert RN  Outcome: Progressing  8/30/2024 1647 by Ignacio Blake RN  Outcome: Progressing     Problem: Respiratory - Adult  Goal: Achieves optimal ventilation and oxygenation  8/31/2024 0503 by Argentina Gilbert RN  Outcome: Progressing  8/30/2024 2151 by Lashonda Marie RCP  Outcome: Progressing  8/30/2024 1647 by Ignacio Blake RN  Outcome: Progressing
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
## 2024-08-31 NOTE — PLAN OF CARE
Problem: Respiratory - Adult  Goal: Achieves optimal ventilation and oxygenation  8/30/2024 2151 by Lashonda Marie RCP  Outcome: Progressing

## 2024-09-01 ENCOUNTER — APPOINTMENT (OUTPATIENT)
Dept: GENERAL RADIOLOGY | Age: 44
DRG: 510 | End: 2024-09-01
Payer: MEDICAID

## 2024-09-01 VITALS
BODY MASS INDEX: 31.24 KG/M2 | HEIGHT: 77 IN | DIASTOLIC BLOOD PRESSURE: 80 MMHG | TEMPERATURE: 97.7 F | OXYGEN SATURATION: 96 % | HEART RATE: 93 BPM | SYSTOLIC BLOOD PRESSURE: 155 MMHG | RESPIRATION RATE: 18 BRPM | WEIGHT: 264.55 LBS

## 2024-09-01 PROBLEM — S02.2XXA CLOSED FRACTURE OF NASAL BONE: Status: ACTIVE | Noted: 2024-09-01

## 2024-09-01 PROBLEM — J93.9 PNEUMOTHORAX: Status: ACTIVE | Noted: 2024-09-01

## 2024-09-01 PROBLEM — J94.2 HEMOTHORAX: Status: ACTIVE | Noted: 2024-09-01

## 2024-09-01 PROCEDURE — 97530 THERAPEUTIC ACTIVITIES: CPT

## 2024-09-01 PROCEDURE — 6360000002 HC RX W HCPCS: Performed by: STUDENT IN AN ORGANIZED HEALTH CARE EDUCATION/TRAINING PROGRAM

## 2024-09-01 PROCEDURE — 6370000000 HC RX 637 (ALT 250 FOR IP)

## 2024-09-01 PROCEDURE — 6370000000 HC RX 637 (ALT 250 FOR IP): Performed by: STUDENT IN AN ORGANIZED HEALTH CARE EDUCATION/TRAINING PROGRAM

## 2024-09-01 PROCEDURE — 2580000003 HC RX 258

## 2024-09-01 PROCEDURE — 97116 GAIT TRAINING THERAPY: CPT

## 2024-09-01 PROCEDURE — 97110 THERAPEUTIC EXERCISES: CPT

## 2024-09-01 PROCEDURE — 71045 X-RAY EXAM CHEST 1 VIEW: CPT

## 2024-09-01 RX ORDER — METHOCARBAMOL 750 MG/1
750 TABLET, FILM COATED ORAL 3 TIMES DAILY
Qty: 21 TABLET | Refills: 0 | Status: SHIPPED | OUTPATIENT
Start: 2024-09-01 | End: 2024-09-01

## 2024-09-01 RX ORDER — IBUPROFEN 600 MG/1
600 TABLET, FILM COATED ORAL
Status: DISCONTINUED | OUTPATIENT
Start: 2024-09-01 | End: 2024-09-01 | Stop reason: HOSPADM

## 2024-09-01 RX ORDER — GABAPENTIN 600 MG/1
300 TABLET ORAL EVERY 8 HOURS SCHEDULED
Qty: 11 TABLET | Refills: 0 | Status: SHIPPED | OUTPATIENT
Start: 2024-09-01 | End: 2024-09-08

## 2024-09-01 RX ORDER — OXYCODONE HYDROCHLORIDE 5 MG/1
5 TABLET ORAL EVERY 8 HOURS PRN
Qty: 9 TABLET | Refills: 0 | Status: SHIPPED | OUTPATIENT
Start: 2024-09-01 | End: 2024-09-04

## 2024-09-01 RX ORDER — IBUPROFEN 400 MG/1
400 TABLET, FILM COATED ORAL EVERY 4 HOURS
Status: DISCONTINUED | OUTPATIENT
Start: 2024-09-01 | End: 2024-09-01

## 2024-09-01 RX ORDER — LIDOCAINE 4 G/G
1 PATCH TOPICAL DAILY
Qty: 7 EACH | Refills: 0 | Status: SHIPPED | OUTPATIENT
Start: 2024-09-02 | End: 2024-09-01

## 2024-09-01 RX ORDER — OXYCODONE HYDROCHLORIDE 5 MG/1
5 TABLET ORAL EVERY 8 HOURS PRN
Qty: 9 TABLET | Refills: 0 | Status: SHIPPED | OUTPATIENT
Start: 2024-09-01 | End: 2024-09-01

## 2024-09-01 RX ORDER — GABAPENTIN 600 MG/1
300 TABLET ORAL EVERY 8 HOURS SCHEDULED
Qty: 11 TABLET | Refills: 0 | Status: SHIPPED | OUTPATIENT
Start: 2024-09-01 | End: 2024-09-01

## 2024-09-01 RX ORDER — METHOCARBAMOL 750 MG/1
750 TABLET, FILM COATED ORAL 3 TIMES DAILY
Qty: 21 TABLET | Refills: 0 | Status: SHIPPED | OUTPATIENT
Start: 2024-09-01 | End: 2024-09-08

## 2024-09-01 RX ORDER — IPRATROPIUM BROMIDE AND ALBUTEROL SULFATE 2.5; .5 MG/3ML; MG/3ML
1 SOLUTION RESPIRATORY (INHALATION) EVERY 4 HOURS PRN
Status: DISCONTINUED | OUTPATIENT
Start: 2024-09-01 | End: 2024-09-01 | Stop reason: HOSPADM

## 2024-09-01 RX ORDER — IBUPROFEN 600 MG/1
600 TABLET, FILM COATED ORAL
Qty: 21 TABLET | Refills: 0 | Status: SHIPPED | OUTPATIENT
Start: 2024-09-01 | End: 2024-09-01

## 2024-09-01 RX ORDER — LIDOCAINE 4 G/G
1 PATCH TOPICAL DAILY
Qty: 7 EACH | Refills: 0 | Status: SHIPPED | OUTPATIENT
Start: 2024-09-02 | End: 2024-09-09

## 2024-09-01 RX ORDER — OXYCODONE HYDROCHLORIDE 5 MG/1
5 TABLET ORAL EVERY 4 HOURS PRN
Status: DISCONTINUED | OUTPATIENT
Start: 2024-09-01 | End: 2024-09-01 | Stop reason: HOSPADM

## 2024-09-01 RX ORDER — IBUPROFEN 600 MG/1
600 TABLET, FILM COATED ORAL
Qty: 21 TABLET | Refills: 0 | Status: SHIPPED | OUTPATIENT
Start: 2024-09-01 | End: 2024-09-08

## 2024-09-01 RX ADMIN — OXYCODONE HYDROCHLORIDE 5 MG: 5 TABLET ORAL at 16:11

## 2024-09-01 RX ADMIN — IBUPROFEN 600 MG: 600 TABLET, FILM COATED ORAL at 12:51

## 2024-09-01 RX ADMIN — ACETAMINOPHEN 1000 MG: 500 TABLET ORAL at 06:13

## 2024-09-01 RX ADMIN — GABAPENTIN 600 MG: 600 TABLET ORAL at 14:26

## 2024-09-01 RX ADMIN — KETOROLAC TROMETHAMINE 15 MG: 15 INJECTION, SOLUTION INTRAMUSCULAR; INTRAVENOUS at 02:26

## 2024-09-01 RX ADMIN — Medication 5 MG: at 02:26

## 2024-09-01 RX ADMIN — IBUPROFEN 600 MG: 600 TABLET, FILM COATED ORAL at 08:07

## 2024-09-01 RX ADMIN — POLYETHYLENE GLYCOL 3350 17 G: 17 POWDER, FOR SOLUTION ORAL at 08:08

## 2024-09-01 RX ADMIN — BACITRACIN: 500 OINTMENT TOPICAL at 15:00

## 2024-09-01 RX ADMIN — BACITRACIN: 500 OINTMENT TOPICAL at 08:07

## 2024-09-01 RX ADMIN — METHOCARBAMOL 750 MG: 750 TABLET ORAL at 02:26

## 2024-09-01 RX ADMIN — ACETAMINOPHEN 1000 MG: 500 TABLET ORAL at 14:26

## 2024-09-01 RX ADMIN — QUETIAPINE FUMARATE 50 MG: 25 TABLET ORAL at 08:08

## 2024-09-01 RX ADMIN — METHOCARBAMOL 750 MG: 750 TABLET ORAL at 08:08

## 2024-09-01 RX ADMIN — IBUPROFEN 600 MG: 600 TABLET, FILM COATED ORAL at 16:11

## 2024-09-01 RX ADMIN — OXYCODONE HYDROCHLORIDE 5 MG: 5 TABLET ORAL at 10:54

## 2024-09-01 RX ADMIN — GABAPENTIN 600 MG: 600 TABLET ORAL at 06:13

## 2024-09-01 RX ADMIN — OXYCODONE HYDROCHLORIDE 10 MG: 5 TABLET ORAL at 04:03

## 2024-09-01 RX ADMIN — SODIUM CHLORIDE, PRESERVATIVE FREE 10 ML: 5 INJECTION INTRAVENOUS at 08:08

## 2024-09-01 RX ADMIN — METHOCARBAMOL 750 MG: 750 TABLET ORAL at 16:11

## 2024-09-01 ASSESSMENT — PAIN DESCRIPTION - DESCRIPTORS
DESCRIPTORS: DISCOMFORT
DESCRIPTORS: ACHING

## 2024-09-01 ASSESSMENT — PAIN DESCRIPTION - LOCATION
LOCATION: RIB CAGE
LOCATION: RIB CAGE
LOCATION: RIB CAGE;ARM
LOCATION: RIB CAGE;ARM
LOCATION: RIB CAGE

## 2024-09-01 ASSESSMENT — PAIN SCALES - GENERAL
PAINLEVEL_OUTOF10: 7
PAINLEVEL_OUTOF10: 7
PAINLEVEL_OUTOF10: 2
PAINLEVEL_OUTOF10: 5
PAINLEVEL_OUTOF10: 5
PAINLEVEL_OUTOF10: 6
PAINLEVEL_OUTOF10: 10

## 2024-09-01 ASSESSMENT — PAIN DESCRIPTION - ORIENTATION: ORIENTATION: RIGHT

## 2024-09-01 NOTE — PLAN OF CARE
Problem: Pain  Goal: Verbalizes/displays adequate comfort level or baseline comfort level  9/1/2024 0451 by Lou Howard RN  Outcome: Progressing     Problem: Skin/Tissue Integrity  Goal: Absence of new skin breakdown  Description: 1.  Monitor for areas of redness and/or skin breakdown  2.  Assess vascular access sites hourly  3.  Every 4-6 hours minimum:  Change oxygen saturation probe site  4.  Every 4-6 hours:  If on nasal continuous positive airway pressure, respiratory therapy assess nares and determine need for appliance change or resting period.  9/1/2024 0451 by Lou Howard RN  Outcome: Progressing     Problem: Safety - Adult  Goal: Free from fall injury  9/1/2024 0451 by Lou Howard RN  Outcome: Progressing     Problem: Discharge Planning  Goal: Discharge to home or other facility with appropriate resources  9/1/2024 0451 by Lou Howard RN  Outcome: Progressing     Problem: ABCDS Injury Assessment  Goal: Absence of physical injury  9/1/2024 0451 by Lou Howard RN  Outcome: Progressing

## 2024-09-01 NOTE — PLAN OF CARE
Problem: Pain  Goal: Verbalizes/displays adequate comfort level or baseline comfort level  9/1/2024 1643 by Charlette Torres RN  Outcome: Completed  9/1/2024 0451 by Lou Howard RN  Outcome: Progressing     Problem: Skin/Tissue Integrity  Goal: Absence of new skin breakdown  Description: 1.  Monitor for areas of redness and/or skin breakdown  2.  Assess vascular access sites hourly  3.  Every 4-6 hours minimum:  Change oxygen saturation probe site  4.  Every 4-6 hours:  If on nasal continuous positive airway pressure, respiratory therapy assess nares and determine need for appliance change or resting period.  9/1/2024 1643 by Charlette Torres RN  Outcome: Completed  9/1/2024 0451 by Lou Howard RN  Outcome: Progressing     Problem: Safety - Adult  Goal: Free from fall injury  9/1/2024 1643 by Charlette Torres RN  Outcome: Completed  Flowsheets (Taken 9/1/2024 0933)  Free From Fall Injury:   Instruct family/caregiver on patient safety   Based on caregiver fall risk screen, instruct family/caregiver to ask for assistance with transferring infant if caregiver noted to have fall risk factors  9/1/2024 0451 by Lou Howard RN  Outcome: Progressing     Problem: Discharge Planning  Goal: Discharge to home or other facility with appropriate resources  9/1/2024 1643 by Charlette Torres RN  Outcome: Completed  9/1/2024 0451 by Lou Howard RN  Outcome: Progressing     Problem: ABCDS Injury Assessment  Goal: Absence of physical injury  9/1/2024 1643 by Charlette Torres RN  Outcome: Completed  Flowsheets (Taken 9/1/2024 0933)  Absence of Physical Injury: Implement safety measures based on patient assessment  9/1/2024 0451 by Lou Howard RN  Outcome: Progressing     Problem: Respiratory - Adult  Goal: Achieves optimal ventilation and oxygenation  9/1/2024 1643 by Charlette Torres RN  Outcome: Completed  9/1/2024 0451 by Lou Howard RN  Outcome: Progressing  Flowsheets (Taken
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
## 2024-09-02 ENCOUNTER — HOSPITAL ENCOUNTER (EMERGENCY)
Age: 44
Discharge: ANOTHER HOSPITAL | End: 2024-09-02
Payer: COMMERCIAL

## 2024-09-02 VITALS
TEMPERATURE: 97.7 F | HEART RATE: 80 BPM | DIASTOLIC BLOOD PRESSURE: 89 MMHG | SYSTOLIC BLOOD PRESSURE: 140 MMHG | OXYGEN SATURATION: 97 %

## 2024-09-02 VITALS — BODY MASS INDEX: 33.9 KG/M2

## 2024-09-02 VITALS — OXYGEN SATURATION: 95 %

## 2024-09-02 VITALS — OXYGEN SATURATION: 96 % | HEART RATE: 77 BPM

## 2024-09-02 DIAGNOSIS — R52: Primary | ICD-10-CM

## 2024-09-02 PROCEDURE — 96374 THER/PROPH/DIAG INJ IV PUSH: CPT

## 2024-09-02 PROCEDURE — 99285 EMERGENCY DEPT VISIT HI MDM: CPT
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
## 2024-09-03 LAB
EKG ATRIAL RATE: 73 BPM
EKG ATRIAL RATE: 89 BPM
EKG P AXIS: 33 DEGREES
EKG P AXIS: 37 DEGREES
EKG P-R INTERVAL: 158 MS
EKG P-R INTERVAL: 178 MS
EKG Q-T INTERVAL: 330 MS
EKG Q-T INTERVAL: 346 MS
EKG QRS DURATION: 84 MS
EKG QRS DURATION: 98 MS
EKG QTC CALCULATION (BAZETT): 381 MS
EKG QTC CALCULATION (BAZETT): 401 MS
EKG R AXIS: 24 DEGREES
EKG R AXIS: 34 DEGREES
EKG T AXIS: -4 DEGREES
EKG T AXIS: -7 DEGREES
EKG VENTRICULAR RATE: 73 BPM
EKG VENTRICULAR RATE: 89 BPM
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
## 2024-09-11 ENCOUNTER — OFFICE VISIT (OUTPATIENT)
Dept: NEUROSURGERY | Age: 44
End: 2024-09-11

## 2024-09-11 VITALS
WEIGHT: 263 LBS | DIASTOLIC BLOOD PRESSURE: 83 MMHG | SYSTOLIC BLOOD PRESSURE: 119 MMHG | BODY MASS INDEX: 31.05 KG/M2 | HEIGHT: 77 IN | HEART RATE: 81 BPM
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
## 2024-09-11 DIAGNOSIS — S12.9XXD CLOSED FRACTURE OF MULTIPLE CERVICAL VERTEBRAE, SUBSEQUENT ENCOUNTER: Primary | ICD-10-CM

## 2024-09-11 DIAGNOSIS — S02.92XD CLOSED FRACTURE OF FACE BONES DUE TO MOTOR VEHICLE ACCIDENT WITH ROUTINE HEALING, SUBSEQUENT ENCOUNTER: ICD-10-CM

## 2024-09-11 RX ORDER — LIDOCAINE 50 MG/G
1 PATCH TOPICAL EVERY 24 HOURS
COMMUNITY
Start: 2024-09-09 | End: 2024-09-12 | Stop reason: SDUPTHER

## 2024-09-11 RX ORDER — CYCLOBENZAPRINE HCL 10 MG
10 TABLET ORAL NIGHTLY PRN
COMMUNITY
Start: 2023-06-21 | End: 2024-09-12 | Stop reason: SDUPTHER

## 2024-09-11 RX ORDER — OXYCODONE HYDROCHLORIDE 5 MG/1
5 TABLET ORAL EVERY 4 HOURS PRN
COMMUNITY
End: 2024-09-12 | Stop reason: SDUPTHER

## 2024-09-11 RX ORDER — METHOCARBAMOL 500 MG/1
500 TABLET, FILM COATED ORAL 3 TIMES DAILY
COMMUNITY
Start: 2024-09-03

## 2024-09-11 RX ORDER — ACETAMINOPHEN 325 MG/1
325 TABLET ORAL EVERY 6 HOURS PRN
COMMUNITY
Start: 2024-09-03

## 2024-09-12 ENCOUNTER — OFFICE VISIT (OUTPATIENT)
Dept: ORTHOPEDIC SURGERY | Age: 44
End: 2024-09-12

## 2024-09-12 VITALS — BODY MASS INDEX: 31.05 KG/M2 | HEIGHT: 77 IN | WEIGHT: 263 LBS

## 2024-09-12 DIAGNOSIS — S52.332D CLOSED DISPLACED OBLIQUE FRACTURE OF SHAFT OF LEFT RADIUS WITH ROUTINE HEALING, SUBSEQUENT ENCOUNTER: Primary | ICD-10-CM

## 2024-09-12 RX ORDER — GABAPENTIN 600 MG/1
300 TABLET ORAL EVERY 8 HOURS SCHEDULED
Qty: 11 TABLET | Refills: 0 | Status: SHIPPED | OUTPATIENT
Start: 2024-09-12 | End: 2024-09-19

## 2024-09-12 RX ORDER — CYCLOBENZAPRINE HCL 10 MG
10 TABLET ORAL 3 TIMES DAILY PRN
Qty: 30 TABLET | Refills: 0 | Status: SHIPPED | OUTPATIENT
Start: 2024-09-12 | End: 2024-09-26

## 2024-09-12 RX ORDER — LIDOCAINE 50 MG/G
1 PATCH TOPICAL EVERY 24 HOURS
Qty: 30 PATCH | Refills: 0 | Status: SHIPPED | OUTPATIENT
Start: 2024-09-12

## 2024-09-12 RX ORDER — OXYCODONE HYDROCHLORIDE 5 MG/1
5 TABLET ORAL EVERY 4 HOURS PRN
Qty: 30 TABLET | Refills: 0 | Status: SHIPPED | OUTPATIENT
Start: 2024-09-12 | End: 2024-09-19

## 2024-09-16 ENCOUNTER — TELEPHONE (OUTPATIENT)
Dept: BURN CARE | Age: 44
End: 2024-09-16

## 2024-09-16 NOTE — TELEPHONE ENCOUNTER
Patients girlfriend is requesting a call to possibly schedule patient for plastics as [facility] would not see for insurance. Patient is also being seen at [facility]. She can be reached at [phone] or [phone] . Okay to leave a message.

## 2024-09-24 ENCOUNTER — OFFICE VISIT (OUTPATIENT)
Dept: BURN CARE | Age: 44
End: 2024-09-24

## 2024-09-24 VITALS
DIASTOLIC BLOOD PRESSURE: 102 MMHG | HEART RATE: 84 BPM | HEIGHT: 77 IN | SYSTOLIC BLOOD PRESSURE: 146 MMHG | WEIGHT: 270 LBS | BODY MASS INDEX: 31.88 KG/M2

## 2024-09-24 DIAGNOSIS — S02.2XXA CLOSED FRACTURE OF NASAL BONE, INITIAL ENCOUNTER: ICD-10-CM

## 2024-09-24 DIAGNOSIS — H02.401 UNSPECIFIED PTOSIS OF RIGHT EYELID: Primary | ICD-10-CM

## 2024-10-01 DIAGNOSIS — S52.332D CLOSED DISPLACED OBLIQUE FRACTURE OF SHAFT OF LEFT RADIUS WITH ROUTINE HEALING, SUBSEQUENT ENCOUNTER: ICD-10-CM
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
## 2024-10-02 RX ORDER — OXYCODONE HYDROCHLORIDE 5 MG/1
5 TABLET ORAL EVERY 4 HOURS PRN
Qty: 30 TABLET | Refills: 0 | Status: SHIPPED | OUTPATIENT
Start: 2024-10-02 | End: 2024-10-09

## 2024-10-03 ENCOUNTER — TELEPHONE (OUTPATIENT)
Dept: NEUROSURGERY | Age: 44
End: 2024-10-03

## 2024-10-03 NOTE — TELEPHONE ENCOUNTER
Pt has an appt with you on Monday and he stated that he went to the plastic surgeon and they sent a referral to an ophthalmologist. Pt seen the ophthalmologist that was in [facility] and he was told to come back in 11 months. He found out he has a brain bleed and requesting to see if a CT of his brain can be order. Please advise.

## 2024-10-03 NOTE — TELEPHONE ENCOUNTER
Patient did not have any brain bleeding that requires any additional imaging. He is struggling with concussion symptoms. Can discuss further at upcoming appointment.

## 2024-10-04 DIAGNOSIS — S12.9XXD CLOSED FRACTURE OF MULTIPLE CERVICAL VERTEBRAE, SUBSEQUENT ENCOUNTER: ICD-10-CM

## 2024-10-07 ENCOUNTER — OFFICE VISIT (OUTPATIENT)
Dept: NEUROSURGERY | Age: 44
End: 2024-10-07
Payer: COMMERCIAL

## 2024-10-07 VITALS
WEIGHT: 268 LBS | HEART RATE: 77 BPM | HEIGHT: 77 IN | SYSTOLIC BLOOD PRESSURE: 117 MMHG | BODY MASS INDEX: 31.64 KG/M2 | DIASTOLIC BLOOD PRESSURE: 82 MMHG

## 2024-10-07 DIAGNOSIS — S02.92XD CLOSED FRACTURE OF FACE BONES DUE TO MOTOR VEHICLE ACCIDENT WITH ROUTINE HEALING, SUBSEQUENT ENCOUNTER: ICD-10-CM
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
## 2024-10-07 DIAGNOSIS — S12.9XXD CLOSED FRACTURE OF MULTIPLE CERVICAL VERTEBRAE, SUBSEQUENT ENCOUNTER: Primary | ICD-10-CM

## 2024-10-07 PROCEDURE — 99214 OFFICE O/P EST MOD 30 MIN: CPT | Performed by: NURSE PRACTITIONER

## 2024-10-07 RX ORDER — NAPROXEN 500 MG/1
500 TABLET ORAL 2 TIMES DAILY WITH MEALS
COMMUNITY
Start: 2024-09-17
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
## 2024-10-10 ENCOUNTER — OFFICE VISIT (OUTPATIENT)
Dept: ORTHOPEDIC SURGERY | Age: 44
End: 2024-10-10

## 2024-10-10 VITALS — BODY MASS INDEX: 31.77 KG/M2 | HEIGHT: 77 IN

## 2024-10-10 DIAGNOSIS — S52.332D CLOSED DISPLACED OBLIQUE FRACTURE OF SHAFT OF LEFT RADIUS WITH ROUTINE HEALING, SUBSEQUENT ENCOUNTER: Primary | ICD-10-CM

## 2024-10-10 PROCEDURE — 99024 POSTOP FOLLOW-UP VISIT: CPT | Performed by: ORTHOPAEDIC SURGERY
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
## 2024-10-25 DIAGNOSIS — S52.332D CLOSED DISPLACED OBLIQUE FRACTURE OF SHAFT OF LEFT RADIUS WITH ROUTINE HEALING, SUBSEQUENT ENCOUNTER: ICD-10-CM

## 2024-10-29 RX ORDER — GABAPENTIN 600 MG/1
300 TABLET ORAL EVERY 8 HOURS SCHEDULED
Qty: 11 TABLET | Refills: 0 | Status: SHIPPED | OUTPATIENT
Start: 2024-10-29 | End: 2024-11-05

## 2024-10-29 RX ORDER — OXYCODONE HYDROCHLORIDE 5 MG/1
5 TABLET ORAL EVERY 8 HOURS PRN
Qty: 15 TABLET | Refills: 0 | Status: SHIPPED | OUTPATIENT
Start: 2024-10-29 | End: 2024-11-05

## 2024-11-15 ENCOUNTER — TELEPHONE (OUTPATIENT)
Dept: NEUROSURGERY | Age: 44
End: 2024-11-15

## 2024-11-15 NOTE — TELEPHONE ENCOUNTER
I received the images in PACS for the pt CT and xray pt wanting you to view the images to see if pt still have to wear his cervical collar? Please advise

## 2024-11-15 NOTE — TELEPHONE ENCOUNTER
I spoke with pt and let pt know what you stated and pt would like to keep his appt on Monday instead of moving it to Thursday.

## 2024-11-18 ENCOUNTER — HOSPITAL ENCOUNTER (OUTPATIENT)
Dept: GENERAL RADIOLOGY | Age: 44
Discharge: HOME | End: 2024-11-20
Payer: OTHER MISCELLANEOUS

## 2024-11-18 ENCOUNTER — OFFICE VISIT (OUTPATIENT)
Dept: NEUROSURGERY | Age: 44
End: 2024-11-18
Payer: COMMERCIAL

## 2024-11-18 ENCOUNTER — HOSPITAL ENCOUNTER (OUTPATIENT)
Age: 44
Discharge: HOME | End: 2024-11-20

## 2024-11-18 VITALS
HEIGHT: 77 IN | DIASTOLIC BLOOD PRESSURE: 83 MMHG | BODY MASS INDEX: 32 KG/M2 | HEART RATE: 83 BPM | SYSTOLIC BLOOD PRESSURE: 122 MMHG | WEIGHT: 271 LBS

## 2024-11-18 DIAGNOSIS — S12.9XXD CLOSED FRACTURE OF MULTIPLE CERVICAL VERTEBRAE, SUBSEQUENT ENCOUNTER: ICD-10-CM

## 2024-11-18 DIAGNOSIS — S12.9XXD CLOSED FRACTURE OF MULTIPLE CERVICAL VERTEBRAE, SUBSEQUENT ENCOUNTER: Primary | ICD-10-CM

## 2024-11-18 PROBLEM — R73.09 ABNORMAL GLUCOSE: Status: ACTIVE | Noted: 2022-03-29

## 2024-11-18 PROBLEM — F14.10 COCAINE USE DISORDER (HCC): Status: ACTIVE | Noted: 2024-11-01

## 2024-11-18 PROBLEM — M25.512 LEFT ANTERIOR SHOULDER PAIN: Status: ACTIVE | Noted: 2022-05-02
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
## 2024-11-18 PROBLEM — H04.123 DRY EYE SYNDROME OF BOTH EYES: Status: ACTIVE | Noted: 2024-09-27

## 2024-11-18 PROBLEM — S99.911S RIGHT ANKLE INJURY, SEQUELA: Status: ACTIVE | Noted: 2023-08-09

## 2024-11-18 PROBLEM — G47.19 EXCESSIVE DAYTIME SLEEPINESS: Status: ACTIVE | Noted: 2022-03-29

## 2024-11-18 PROBLEM — G47.30 SLEEP APNEA: Status: ACTIVE | Noted: 2022-09-21

## 2024-11-18 PROBLEM — G47.01 INSOMNIA DUE TO MEDICAL CONDITION: Status: ACTIVE | Noted: 2023-06-21

## 2024-11-18 PROBLEM — R06.83 LOUD SNORING: Status: ACTIVE | Noted: 2022-03-29

## 2024-11-18 PROBLEM — M10.9 ACUTE GOUT OF RIGHT FOOT: Status: ACTIVE | Noted: 2022-03-29

## 2024-11-18 PROBLEM — K21.9 GASTROESOPHAGEAL REFLUX DISEASE WITHOUT ESOPHAGITIS: Status: ACTIVE | Noted: 2022-03-29

## 2024-11-18 PROBLEM — R53.82 CHRONIC FATIGUE: Status: ACTIVE | Noted: 2022-03-29

## 2024-11-18 PROBLEM — G47.9 DIFFICULTY SLEEPING: Status: ACTIVE | Noted: 2023-06-21

## 2024-11-18 PROBLEM — M77.01 EPICONDYLITIS ELBOW, MEDIAL, RIGHT: Status: ACTIVE | Noted: 2022-05-02

## 2024-11-18 PROBLEM — S22.42XD CLOSED FRACTURE OF MULTIPLE RIBS OF LEFT SIDE WITH ROUTINE HEALING: Status: ACTIVE | Noted: 2024-09-09

## 2024-11-18 PROBLEM — F25.8 OTHER SCHIZOAFFECTIVE DISORDERS (HCC): Status: ACTIVE | Noted: 2024-11-01

## 2024-11-18 PROBLEM — S12.501D CLOSED NONDISPLACED FRACTURE OF SIXTH CERVICAL VERTEBRA WITH ROUTINE HEALING: Status: ACTIVE | Noted: 2024-09-09

## 2024-11-18 PROBLEM — R79.89 ABNORMAL CBC: Status: ACTIVE | Noted: 2022-07-13

## 2024-11-18 PROCEDURE — 99213 OFFICE O/P EST LOW 20 MIN: CPT | Performed by: NURSE PRACTITIONER

## 2024-11-18 PROCEDURE — 72040 X-RAY EXAM NECK SPINE 2-3 VW: CPT

## 2024-11-18 RX ORDER — RISPERIDONE 1 MG/1
1 TABLET ORAL DAILY
COMMUNITY
Start: 2024-11-01
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
## 2024-11-21 ENCOUNTER — OFFICE VISIT (OUTPATIENT)
Dept: ORTHOPEDIC SURGERY | Age: 44
End: 2024-11-21

## 2024-11-21 VITALS — HEIGHT: 77 IN | BODY MASS INDEX: 32.13 KG/M2

## 2024-11-21 DIAGNOSIS — S52.332D CLOSED DISPLACED OBLIQUE FRACTURE OF SHAFT OF LEFT RADIUS WITH ROUTINE HEALING, SUBSEQUENT ENCOUNTER: Primary | ICD-10-CM

## 2024-11-21 PROCEDURE — 99024 POSTOP FOLLOW-UP VISIT: CPT | Performed by: ORTHOPAEDIC SURGERY
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
## 2024-11-27 NOTE — PROGRESS NOTES
Annita, was evaluated through a synchronous (real-time) audio-video encounter. The patient (or guardian if applicable) is aware that this is a billable service, which includes applicable co-pays. This Virtual Visit was conducted with patient's (and/or legal guardian's) consent. Patient identification was verified, and a caregiver was present when appropriate.   The patient was located at Home: [address] [place] [id number]  Provider was located at Home (Appt Dept State): [place]  Confirm you are appropriately licensed, registered, or certified to deliver care in the state where the patient is located as indicated above. If you are not or unsure, please re-schedule the visit: Yes, I confirm.      Total time spent for this encounter: 10    --Gena Avery DO on 12/6/2024 at 130pm    An electronic signature was used to authenticate this note.

## 2024-12-06 ENCOUNTER — TELEMEDICINE (OUTPATIENT)
Dept: NEUROSURGERY | Age: 44
End: 2024-12-06
Payer: COMMERCIAL

## 2024-12-06 DIAGNOSIS — S12.9XXD CLOSED FRACTURE OF MULTIPLE CERVICAL VERTEBRAE, SUBSEQUENT ENCOUNTER: Primary | ICD-10-CM
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
## 2024-12-06 DIAGNOSIS — S02.92XD CLOSED FRACTURE OF FACE BONES DUE TO MOTOR VEHICLE ACCIDENT WITH ROUTINE HEALING, SUBSEQUENT ENCOUNTER: ICD-10-CM
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
## 2024-12-06 PROCEDURE — 99213 OFFICE O/P EST LOW 20 MIN: CPT | Performed by: NEUROLOGICAL SURGERY
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
## 2024-12-09 PROBLEM — S02.92XA CLOSED FRACTURE OF FACE BONES DUE TO MOTOR VEHICLE ACCIDENT: Status: ACTIVE | Noted: 2024-09-01

## 2025-02-12 ENCOUNTER — TELEPHONE (OUTPATIENT)
Dept: ORTHOPEDIC SURGERY | Age: 45
End: 2025-02-12

## 2025-02-12 NOTE — TELEPHONE ENCOUNTER
Rosio from [facility] is requesting office notes for Orlando's last appointment along with any orders and diagnosis codes.    Please fax to [phone].    Thank you.

## 2025-02-12 NOTE — TELEPHONE ENCOUNTER
Spoke with Rosio. Patient has not been seen since November. There are no updates noted or orders at this time.

## 2025-02-20 ENCOUNTER — OFFICE VISIT (OUTPATIENT)
Dept: ORTHOPEDIC SURGERY | Age: 45
End: 2025-02-20
Payer: COMMERCIAL

## 2025-02-20 VITALS — BODY MASS INDEX: 32.47 KG/M2 | HEIGHT: 77 IN | WEIGHT: 275 LBS

## 2025-02-20 DIAGNOSIS — S52.332D CLOSED DISPLACED OBLIQUE FRACTURE OF SHAFT OF LEFT RADIUS WITH ROUTINE HEALING, SUBSEQUENT ENCOUNTER: Primary | ICD-10-CM

## 2025-02-20 PROCEDURE — 99213 OFFICE O/P EST LOW 20 MIN: CPT | Performed by: ORTHOPAEDIC SURGERY
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
## 2025-06-18 ENCOUNTER — APPOINTMENT (OUTPATIENT)
Dept: CT IMAGING | Age: 45
End: 2025-06-18
Payer: MEDICAID

## 2025-06-18 ENCOUNTER — HOSPITAL ENCOUNTER (EMERGENCY)
Age: 45
Discharge: HOME | End: 2025-06-18
Attending: EMERGENCY MEDICINE
Payer: MEDICAID

## 2025-06-18 VITALS
OXYGEN SATURATION: 94 % | HEART RATE: 108 BPM | DIASTOLIC BLOOD PRESSURE: 66 MMHG | SYSTOLIC BLOOD PRESSURE: 132 MMHG | RESPIRATION RATE: 16 BRPM

## 2025-06-18 DIAGNOSIS — S16.1XXA CERVICAL STRAIN, ACUTE, INITIAL ENCOUNTER: Primary | ICD-10-CM

## 2025-06-18 PROCEDURE — 72125 CT NECK SPINE W/O DYE: CPT

## 2025-06-18 PROCEDURE — 99284 EMERGENCY DEPT VISIT MOD MDM: CPT

## 2025-06-18 PROCEDURE — 70450 CT HEAD/BRAIN W/O DYE: CPT

## 2025-06-18 ASSESSMENT — ENCOUNTER SYMPTOMS
BACK PAIN: 0
ABDOMINAL DISTENTION: 0
SORE THROAT: 0
SHORTNESS OF BREATH: 0

## 2025-06-18 ASSESSMENT — PAIN SCALES - GENERAL
PAINLEVEL_OUTOF10: 7
PAINLEVEL_OUTOF10: 7

## 2025-06-18 ASSESSMENT — PAIN DESCRIPTION - LOCATION: LOCATION: NECK;HEAD

## 2025-06-18 NOTE — DISCHARGE INSTRUCTIONS
Tylenol and ibuprofen as needed for pain.  Ice to the area 20 minutes at a time multiple times a day.  Follow-up with your doctor as needed.
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
## (undated) DEVICE — GLOVE ORTHO 8   MSG9480

## (undated) DEVICE — CYSTO/BLADDER IRRIGATION SET, REGULATING CLAMP

## (undated) DEVICE — BANDAGE COMPR W6INXL12FT SMOOTH FOR LIMB EXSANG ESMARCH

## (undated) DEVICE — GOWN,AURORA,NONREINFORCED,LARGE: Brand: MEDLINE

## (undated) DEVICE — C-ARMOR C-ARM EQUIPMENT COVERS CLEAR STERILE UNIVERSAL FIT 12 PER CASE: Brand: C-ARMOR

## (undated) DEVICE — SUTURE VICRYL SZ 2-0 L18IN ABSRB UD CT-1 L36MM 1/2 CIR J839D

## (undated) DEVICE — PADDING CAST W6INXL4YD COT LO LINTING WYTEX

## (undated) DEVICE — APPLICATOR MEDICATED 26 CC SOLUTION HI LT ORNG CHLORAPREP

## (undated) DEVICE — STRAP ARMBRD W1.5XL32IN FOAM STR YET SFT W/ HK AND LOOP

## (undated) DEVICE — Z DISCONTINUED USE 2220295 SUTURE VICRYL SZ 0 L18IN ABSRB UD L36MM CT-1 1/2 CIR J840D

## (undated) DEVICE — GLOVE ORANGE PI 8   MSG9080

## (undated) DEVICE — BANDAGE,ELASTIC,ESMARK,STERILE,4"X9',LF: Brand: MEDLINE

## (undated) DEVICE — SURGICAL SUCTION CONNECTING TUBE WITH MALE CONNECTOR AND SUCTION CLAMP, 2 FT. LONG (.6 M), 5 MM I.D.: Brand: CONMED

## (undated) DEVICE — 60-7070-103 TRNQT,DPSB,PLC RED: Brand: MEDLINE RENEWAL

## (undated) DEVICE — STRAP,POSITIONING,KNEE/BODY,FOAM,4X60": Brand: MEDLINE

## (undated) DEVICE — SVMMC ORTH SPL DRP PK

## (undated) DEVICE — DRAPE,U/ SHT,SPLIT,PLAS,STERIL: Brand: MEDLINE

## (undated) DEVICE — LIQUIBAND RAPID ADHESIVE 36/CS 0.8ML: Brand: MEDLINE

## (undated) DEVICE — BNDG,ELSTC,MATRIX,STRL,4"X5YD,LF,HOOK&LP: Brand: MEDLINE